# Patient Record
Sex: FEMALE | Race: WHITE | NOT HISPANIC OR LATINO | Employment: FULL TIME | URBAN - METROPOLITAN AREA
[De-identification: names, ages, dates, MRNs, and addresses within clinical notes are randomized per-mention and may not be internally consistent; named-entity substitution may affect disease eponyms.]

---

## 2017-04-08 ENCOUNTER — HOSPITAL ENCOUNTER (EMERGENCY)
Facility: CLINIC | Age: 32
Discharge: HOME | End: 2017-04-08
Attending: EMERGENCY MEDICINE | Admitting: EMERGENCY MEDICINE
Payer: COMMERCIAL

## 2017-04-08 VITALS
HEART RATE: 74 BPM | TEMPERATURE: 98.8 F | OXYGEN SATURATION: 99 % | SYSTOLIC BLOOD PRESSURE: 115 MMHG | DIASTOLIC BLOOD PRESSURE: 58 MMHG | RESPIRATION RATE: 16 BRPM

## 2017-04-08 DIAGNOSIS — G51.9 FACIAL NERVE DISORDER: ICD-10-CM

## 2017-04-08 DIAGNOSIS — G50.0 TRIGEMINAL NEURALGIA: ICD-10-CM

## 2017-04-08 LAB
ANION GAP SERPL CALCULATED.3IONS-SCNC: 6 MMOL/L (ref 3–14)
BASOPHILS # BLD AUTO: 0.1 10E9/L (ref 0–0.2)
BASOPHILS NFR BLD AUTO: 0.5 %
BUN SERPL-MCNC: 23 MG/DL (ref 7–30)
CALCIUM SERPL-MCNC: 8 MG/DL (ref 8.5–10.1)
CHLORIDE SERPL-SCNC: 115 MMOL/L (ref 94–109)
CO2 SERPL-SCNC: 23 MMOL/L (ref 20–32)
CREAT SERPL-MCNC: 0.74 MG/DL (ref 0.52–1.04)
CRP SERPL-MCNC: <2.9 MG/L (ref 0–8)
DIFFERENTIAL METHOD BLD: NORMAL
EOSINOPHIL # BLD AUTO: 0.4 10E9/L (ref 0–0.7)
EOSINOPHIL NFR BLD AUTO: 4.7 %
ERYTHROCYTE [DISTWIDTH] IN BLOOD BY AUTOMATED COUNT: 13 % (ref 10–15)
ERYTHROCYTE [SEDIMENTATION RATE] IN BLOOD BY WESTERGREN METHOD: 7 MM/H (ref 0–20)
GFR SERPL CREATININE-BSD FRML MDRD: ABNORMAL ML/MIN/1.7M2
GLUCOSE SERPL-MCNC: 93 MG/DL (ref 70–99)
HCT VFR BLD AUTO: 40.6 % (ref 35–47)
HGB BLD-MCNC: 13.6 G/DL (ref 11.7–15.7)
IMM GRANULOCYTES # BLD: 0 10E9/L (ref 0–0.4)
IMM GRANULOCYTES NFR BLD: 0.2 %
LYMPHOCYTES # BLD AUTO: 3 10E9/L (ref 0.8–5.3)
LYMPHOCYTES NFR BLD AUTO: 32.2 %
MCH RBC QN AUTO: 31.3 PG (ref 26.5–33)
MCHC RBC AUTO-ENTMCNC: 33.5 G/DL (ref 31.5–36.5)
MCV RBC AUTO: 93 FL (ref 78–100)
MONOCYTES # BLD AUTO: 1 10E9/L (ref 0–1.3)
MONOCYTES NFR BLD AUTO: 10.3 %
NEUTROPHILS # BLD AUTO: 4.8 10E9/L (ref 1.6–8.3)
NEUTROPHILS NFR BLD AUTO: 52.1 %
PLATELET # BLD AUTO: 292 10E9/L (ref 150–450)
POTASSIUM SERPL-SCNC: 4 MMOL/L (ref 3.4–5.3)
RBC # BLD AUTO: 4.35 10E12/L (ref 3.8–5.2)
SODIUM SERPL-SCNC: 144 MMOL/L (ref 133–144)
WBC # BLD AUTO: 9.2 10E9/L (ref 4–11)

## 2017-04-08 PROCEDURE — 99285 EMERGENCY DEPT VISIT HI MDM: CPT | Mod: 25 | Performed by: EMERGENCY MEDICINE

## 2017-04-08 PROCEDURE — 80048 BASIC METABOLIC PNL TOTAL CA: CPT | Performed by: EMERGENCY MEDICINE

## 2017-04-08 PROCEDURE — 96372 THER/PROPH/DIAG INJ SC/IM: CPT | Performed by: EMERGENCY MEDICINE

## 2017-04-08 PROCEDURE — 85025 COMPLETE CBC W/AUTO DIFF WBC: CPT | Performed by: EMERGENCY MEDICINE

## 2017-04-08 PROCEDURE — 86140 C-REACTIVE PROTEIN: CPT | Performed by: EMERGENCY MEDICINE

## 2017-04-08 PROCEDURE — 25000132 ZZH RX MED GY IP 250 OP 250 PS 637: Performed by: EMERGENCY MEDICINE

## 2017-04-08 PROCEDURE — 85652 RBC SED RATE AUTOMATED: CPT | Performed by: EMERGENCY MEDICINE

## 2017-04-08 PROCEDURE — 99284 EMERGENCY DEPT VISIT MOD MDM: CPT | Mod: Z6 | Performed by: EMERGENCY MEDICINE

## 2017-04-08 PROCEDURE — 25000128 H RX IP 250 OP 636: Performed by: EMERGENCY MEDICINE

## 2017-04-08 RX ORDER — GABAPENTIN 300 MG/1
300 CAPSULE ORAL ONCE
Status: COMPLETED | OUTPATIENT
Start: 2017-04-08 | End: 2017-04-08

## 2017-04-08 RX ORDER — PREDNISONE 20 MG/1
TABLET ORAL
Qty: 9 TABLET | Refills: 0 | Status: SHIPPED | OUTPATIENT
Start: 2017-04-08 | End: 2017-04-18

## 2017-04-08 RX ORDER — GABAPENTIN 300 MG/1
300 CAPSULE ORAL 3 TIMES DAILY
Qty: 90 CAPSULE | Refills: 1 | Status: SHIPPED | OUTPATIENT
Start: 2017-04-08 | End: 2017-11-10

## 2017-04-08 RX ORDER — VALACYCLOVIR HYDROCHLORIDE 1 G/1
1000 TABLET, FILM COATED ORAL 2 TIMES DAILY
Qty: 14 TABLET | Refills: 0 | Status: SHIPPED | OUTPATIENT
Start: 2017-04-08 | End: 2017-05-18

## 2017-04-08 RX ADMIN — GABAPENTIN 300 MG: 300 CAPSULE ORAL at 17:10

## 2017-04-08 RX ADMIN — HYDROMORPHONE HYDROCHLORIDE 1 MG: 1 INJECTION, SOLUTION INTRAMUSCULAR; INTRAVENOUS; SUBCUTANEOUS at 17:08

## 2017-04-08 NOTE — ED PROVIDER NOTES
History     Chief Complaint   Patient presents with     Facial Pain     Right sided facial pain. Started 4 days ago. History of this.      The history is provided by the patient.     Janeth Argueta is a 31 year old female who presents to the emergency department right sided facial pain. She states that beginning about 4 days ago she has had right sided facial pain. Patient states that in 2016 she was in the ED for similar pain and treated for Lyme's disease with a single dose of Rocephin at that time. She says she was supposed to follow-up with PCP but never did. This current episode began around her right eye but says it has spread to her entire right face and she now feels as if her right sided gums are swollen. She denies tongue involvement, change in vision, or eye tearing. Patient reports the pain is intermittent and comes in waves of sudden sharp pain. The pain is somewhat alleviated with pressure.    I have reviewed the Medications, Allergies, Past Medical and Surgical History, and Social History in the Epic system.    Patient Active Problem List   Diagnosis     Calculus of kidney     CARDIOVASCULAR SCREENING; LDL GOAL LESS THAN 160     GERD (gastroesophageal reflux disease)     Anxiety     Acute gouty arthropathy     Tobacco abuse     Major depressive disorder, recurrent episode, mild (H)     Past Medical History:   Diagnosis Date     Calculus of kidney ages 13 and 22     Tobacco abuse 2015       Past Surgical History:   Procedure Laterality Date     C OPEN RX ELBOW DISLOCATION  high school    fracture right elbow, open fixation      SECTION  2011    Procedure: SECTION; Surgeon:LACHO VALENTIN; Location: OR      TOOTH EXTRACTION W/FORCEP  age 17    wisdom tooth extraction     LITHOTRIPSY  2008    Left lithotripsy. [facility].     ORTHOPEDIC SURGERY         Family History   Problem Relation Age of Onset     DIABETES Paternal Grandmother       Hypertension Mother      CEREBROVASCULAR DISEASE Paternal Grandmother      Cardiovascular Paternal Grandmother      Musculoskeletal Disorder Mother      Congenital hip disorder     Genitourinary Problems Father      Kidney stones on dad's side     Respiratory Father      COPD - he was a smoker     Congenital Anomalies Other      cousin's baby has truncus arteriosus       Social History   Substance Use Topics     Smoking status: Former Smoker     Packs/day: 0.50     Years: 5.00     Types: Cigarettes     Quit date: 1/1/2010     Smokeless tobacco: Never Used     Alcohol use No        Immunization History   Administered Date(s) Administered     Influenza (IIV3) 10/24/2007, 12/26/2011, 10/26/2012     TDAP Vaccine (Adacel) 12/26/2011          Allergies   Allergen Reactions     No Known Allergies        Current Outpatient Prescriptions   Medication Sig Dispense Refill     IBUPROFEN PO Take 800 mg by mouth       valACYclovir (VALTREX) 1000 mg tablet Take 1 tablet (1,000 mg) by mouth 2 times daily 14 tablet 0     gabapentin (NEURONTIN) 300 MG capsule Take 1 capsule (300 mg) by mouth 3 times daily 90 capsule 1     predniSONE (DELTASONE) 20 MG tablet 2 tabs day 1-2, then 1 tab days 3-4, then 1/2 tab daily for 6 days 9 tablet 0     [DISCONTINUED] gabapentin (NEURONTIN) 300 MG capsule Take 1 capsule (300 mg) by mouth 3 times daily 90 capsule 1     [DISCONTINUED] valACYclovir (VALTREX) 1000 mg tablet Take 1 tablet (1,000 mg) by mouth 2 times daily for 7 days 14 tablet 0     IUD's (PARAGARD INTRAUTERINE COPPER) 1 each by Intrauterine route once. 1 Device 0     Review of Systems   HENT:        Right sided facial pain.   Eyes: Negative for visual disturbance.   All other systems reviewed and are negative.      Physical Exam   BP: 115/58  Pulse: 74  Temp: 98.8  F (37.1  C)  Resp: 16  SpO2: 99 %    Physical Exam   Constitutional: She is oriented to person, place, and time. She appears well-developed and well-nourished.   HENT:   Head:  Normocephalic and atraumatic.   Pain over trigeminal nerve in all branches on right side.   Eyes: Conjunctivae and EOM are normal. Pupils are equal, round, and reactive to light.   Neck: Normal range of motion. Neck supple.   Musculoskeletal: Normal range of motion.   Neurological: She is alert and oriented to person, place, and time.   No facial asymmetry.    Skin: Skin is warm and dry.   Psychiatric: She has a normal mood and affect. Her behavior is normal.   Nursing note and vitals reviewed.      ED Course     ED Course     Procedures             Results for orders placed or performed during the hospital encounter of 04/08/17 (from the past 24 hour(s))   CBC with platelets differential   Result Value Ref Range    WBC 9.2 4.0 - 11.0 10e9/L    RBC Count 4.35 3.8 - 5.2 10e12/L    Hemoglobin 13.6 11.7 - 15.7 g/dL    Hematocrit 40.6 35.0 - 47.0 %    MCV 93 78 - 100 fl    MCH 31.3 26.5 - 33.0 pg    MCHC 33.5 31.5 - 36.5 g/dL    RDW 13.0 10.0 - 15.0 %    Platelet Count 292 150 - 450 10e9/L    Diff Method Automated Method     % Neutrophils 52.1 %    % Lymphocytes 32.2 %    % Monocytes 10.3 %    % Eosinophils 4.7 %    % Basophils 0.5 %    % Immature Granulocytes 0.2 %    Absolute Neutrophil 4.8 1.6 - 8.3 10e9/L    Absolute Lymphocytes 3.0 0.8 - 5.3 10e9/L    Absolute Monocytes 1.0 0.0 - 1.3 10e9/L    Absolute Eosinophils 0.4 0.0 - 0.7 10e9/L    Absolute Basophils 0.1 0.0 - 0.2 10e9/L    Abs Immature Granulocytes 0.0 0 - 0.4 10e9/L   Basic metabolic panel   Result Value Ref Range    Sodium 144 133 - 144 mmol/L    Potassium 4.0 3.4 - 5.3 mmol/L    Chloride 115 (H) 94 - 109 mmol/L    Carbon Dioxide 23 20 - 32 mmol/L    Anion Gap 6 3 - 14 mmol/L    Glucose 93 70 - 99 mg/dL    Urea Nitrogen 23 7 - 30 mg/dL    Creatinine 0.74 0.52 - 1.04 mg/dL    GFR Estimate >90  Non  GFR Calc   >60 mL/min/1.7m2    GFR Estimate If Black >90   GFR Calc   >60 mL/min/1.7m2    Calcium 8.0 (L) 8.5 - 10.1 mg/dL    Erythrocyte sedimentation rate auto   Result Value Ref Range    Sed Rate 7 0 - 20 mm/h   CRP inflammation   Result Value Ref Range    CRP Inflammation <2.9 0.0 - 8.0 mg/L       Medications   gabapentin (NEURONTIN) capsule 300 mg (300 mg Oral Given 4/8/17 1710)   HYDROmorphone (DILAUDID) injection 1 mg (1 mg Intramuscular Given 4/8/17 1708)       Assessments & Plan (with Medical Decision Making)  Janeth is a 31 year old female who presents to the ED with complaints of right sided facial pain that began 4 days ago. She notes history of similar pain in September 2016 and was treated for Lyme's disease at that time and given gabapentin for pain. Upon arrival to the ED, the patient was vitally stable. On exam, she exhibits pain over trigeminal nerve in all branches on right side. Neuro exam is normal. The patient was given 300 mg gabapentin.  In addition, the patient was given Dilaudid 1 mg IM.  She's had significant improvement in her pain control with this combination. Labs were drawn, including CBC, BMP, erythrocyte sedimentation, and CRP.  All her labs were unremarkable.  Supervision recurrence of her trigeminal neuralgia for which I saw and treated her act on 09/20/2016.  As before, we will restart her on the Valtrex, prednisone, and gabapentin.  I did review with her indications to return to the ED for reevaluation.  Impression is for the patient were answered and she was suitable for discharge in satisfactory condition.       I have reviewed the nursing notes.    I have reviewed the findings, diagnosis, plan and need for follow up with the patient.    New Prescriptions    PREDNISONE (DELTASONE) 20 MG TABLET    2 tabs day 1-2, then 1 tab days 3-4, then 1/2 tab daily for 6 days       Final diagnoses:   Trigeminal neuralgia   This document serves as a record of services personally performed by Sulaiman Field MD. It was created on their behalf by Lianne Barrett, a trained medical scribe. The creation of  this record is based on the provider's personal observations and the statements of the patient. This document has been checked and approved by the attending provider.     Note: Chart documentation done in part with Dragon Voice Recognition software. Although reviewed after completion, some word and grammatical errors may remain.    4/8/2017   [facility] EMERGENCY DEPARTMENT     Sulaiman Field MD  04/08/17 9930

## 2017-04-08 NOTE — ED AVS SNAPSHOT
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      Follow-up Information     Follow up with Patsy Mckee MD.    Specialty:  Family Practice    Why:  As needed    Contact information:    [facility]  [address] [place] [address] [phone]          Discharge Instructions         Trigeminal Neuralgia  You have trigeminal neuralgia. This is pain caused by irritation of the trigeminal nerve on your face. Symptoms include sudden, sharp pain in your head or face. It may feel like an electric shock. It can last for several seconds or minutes. It usually happens on only 1 side of your face. Pain may be triggered by things like moving your jaw or a touch on the skin of your face. The pain may be caused by something irritating the trigeminal nerve, such as a blood vessel pressing against it. But the exact cause of this problem often isn t known. Although it can be quite painful, the condition isn t dangerous.  Trigeminal neuralgia is often treated with medications. These include anti-seizure medications or antidepressants. Certain other treatments may also help. In some cases, you may need surgery.    Home care  The health care provider may prescribe medicines to help relieve and prevent pain. Take all medicines as directed. Please note that it may take several changes in dose and medicines before the right combination is found that controls the pain.  General care:    Plan to rest at home today.    Avoid  any specific activities that seem to trigger the pain.    Over the next few weeks, keep a pain diary. Write down when your symptoms happen and how they feel. Certain activities such as touching your face, chewing, talking, or brushing your teeth may bring on the pain. Cold air can also trigger the pain. Make sure you write down any triggers and discuss these with your health care provider. This will help your provider make a plan for your treatment.  Follow-up care  Follow up with your health care provider as advised. If you were referred to a neurologist, be sure to make an appointment.  For more information on your condition, visit::    Facial Pain Association www.fpa-support.org  When to seek medical advice  Call your health care provider right away if any of these occur:    Fever of 100.4 F (38 C) or higher    Headache with very stiff neck    You aren t able to keep liquids down (repeated vomiting)    Extreme drowsiness or confusion    Dizziness or fainting    A new feeling of weakness or numbness or tingling in your arm, leg, or face    Difficulty speaking or seeing       7131-3037 [employer]. [address] [place] [address]. All rights reserved. This information is not intended as a substitute for professional medical care. Always follow your healthcare professional's instructions.          24 Hour Appointment Hotline       To make an appointment at any [facility], call [phone] ([phone]). If you don't have a family doctor or clinic, we will help you find one. [place] clinics are conveniently located to serve the needs of you and your family.             Review of your medicines      START taking        Dose / Directions Last dose taken    predniSONE 20 MG tablet   Commonly known as:  DELTASONE   Quantity:  9 tablet        2 tabs day 1-2, then 1 tab days 3-4, then 1/2 tab daily for 6 days   Refills:  0          Our records show that you are taking the medicines  listed below. If these are incorrect, please call your family doctor or clinic.        Dose / Directions Last dose taken    gabapentin 300 MG capsule   Commonly known as:  NEURONTIN   Dose:  300 mg   Quantity:  90 capsule        Take 1 capsule (300 mg) by mouth 3 times daily   Refills:  1        IBUPROFEN PO   Dose:  800 mg        Take 800 mg by mouth   Refills:  0        paragard intrauterine copper   Dose:  1 each   Quantity:  1 Device        1 each by Intrauterine route once.   Refills:  0        valACYclovir 1000 mg tablet   Commonly known as:  VALTREX   Dose:  1000 mg   Quantity:  14 tablet        Take 1 tablet (1,000 mg) by mouth 2 times daily   Refills:  0                Prescriptions were sent or printed at these locations (3 Prescriptions)                   [facility]   [address] [address]    Telephone:  [phone]   Fax:  [phone]   Hours:                  These medications are not ready yet because we are checking if your insurance will help you pay for them. Call your pharmacy to confirm that your medication is ready for pickup. It may take up to 24 hours for them to receive the prescription. If the prescription is not ready within 3 business days, please contact your clinic or your provider (3 of 3)         valACYclovir (VALTREX) 1000 mg tablet               gabapentin (NEURONTIN) 300 MG capsule               predniSONE (DELTASONE) 20 MG tablet                Procedures and tests performed during your visit     Basic metabolic panel    CBC with platelets differential    CRP inflammation    Erythrocyte sedimentation rate auto      Orders Needing Specimen Collection     None      Pending Results     No orders found from 4/6/2017 to 4/9/2017.            Pending Culture Results     No orders found from 4/6/2017 to 4/9/2017.            Thank you for choosing [facility]       Thank you for choosing [facility] for your care. Our goal is always to provide you  with excellent care. Hearing back from our patients is one way we can continue to improve our services. Please take a few minutes to complete the written survey that you may receive in the mail after you visit with us. Thank you!        [employer] Information     [employer] gives you secure access to your electronic health record. If you see a primary care provider, you can also send messages to your care team and make appointments. If you have questions, please call your primary care clinic.  If you do not have a primary care provider, please call [phone] and they will assist you.        Care EveryWhere ID     This is your Care EveryWhere ID. This could be used by other organizations to access your [place] medical records  [id number]        After Visit Summary       This is your record. Keep this with you and show to your community pharmacist(s) and doctor(s) at your next visit.

## 2017-04-08 NOTE — ED NOTES
Pt presents with severe right facial pain. Started 4 days ago. Pt has had previous episode of this. Pt was treated for lymes.

## 2017-04-08 NOTE — ED AVS SNAPSHOT
[facility] Emergency Department    [address] [place] [address]    Phone:  [phone]    Fax:  [phone]                                       Janeth Argueta   MRN: [id number]    Department:  [facility] Emergency Department   Date of Visit:  4/8/2017           After Visit Summary Signature Page     I have received my discharge instructions, and my questions have been answered. I have discussed any challenges I see with this plan with the nurse or doctor.    ..........................................................................................................................................  Patient/Patient Representative Signature      ..........................................................................................................................................  Patient Representative Print Name and Relationship to Patient    ..................................................               ................................................  Date                                            Time    ..........................................................................................................................................  Reviewed by Signature/Title    ...................................................              ..............................................  Date                                                            Time

## 2017-04-08 NOTE — DISCHARGE INSTRUCTIONS
Trigeminal Neuralgia  You have trigeminal neuralgia. This is pain caused by irritation of the trigeminal nerve on your face. Symptoms include sudden, sharp pain in your head or face. It may feel like an electric shock. It can last for several seconds or minutes. It usually happens on only 1 side of your face. Pain may be triggered by things like moving your jaw or a touch on the skin of your face. The pain may be caused by something irritating the trigeminal nerve, such as a blood vessel pressing against it. But the exact cause of this problem often isn t known. Although it can be quite painful, the condition isn t dangerous.  Trigeminal neuralgia is often treated with medications. These include anti-seizure medications or antidepressants. Certain other treatments may also help. In some cases, you may need surgery.    Home care  The health care provider may prescribe medicines to help relieve and prevent pain. Take all medicines as directed. Please note that it may take several changes in dose and medicines before the right combination is found that controls the pain.  General care:    Plan to rest at home today.    Avoid any specific activities that seem to trigger the pain.    Over the next few weeks, keep a pain diary. Write down when your symptoms happen and how they feel. Certain activities such as touching your face, chewing, talking, or brushing your teeth may bring on the pain. Cold air can also trigger the pain. Make sure you write down any triggers and discuss these with your health care provider. This will help your provider make a plan for your treatment.  Follow-up care  Follow up with your health care provider as advised. If you were referred to a neurologist, be sure to make an appointment.  For more information on your condition, visit::    Facial Pain Association www.fpa-support.org  When to seek medical advice  Call your health care provider right away if any of these occur:    Fever of 100.4 F  (38 C) or higher    Headache with very stiff neck    You aren t able to keep liquids down (repeated vomiting)    Extreme drowsiness or confusion    Dizziness or fainting    A new feeling of weakness or numbness or tingling in your arm, leg, or face    Difficulty speaking or seeing       1712-1754 [employer]. [address] [place] [address]. All rights reserved. This information is not intended as a substitute for professional medical care. Always follow your healthcare professional's instructions.

## 2017-05-18 ENCOUNTER — APPOINTMENT (OUTPATIENT)
Dept: GENERAL RADIOLOGY | Facility: CLINIC | Age: 32
End: 2017-05-18
Attending: PHYSICIAN ASSISTANT
Payer: COMMERCIAL

## 2017-05-18 ENCOUNTER — HOSPITAL ENCOUNTER (EMERGENCY)
Facility: CLINIC | Age: 32
Discharge: HOME | End: 2017-05-18
Attending: PHYSICIAN ASSISTANT | Admitting: PHYSICIAN ASSISTANT
Payer: COMMERCIAL

## 2017-05-18 VITALS
OXYGEN SATURATION: 98 % | BODY MASS INDEX: 28.1 KG/M2 | HEART RATE: 87 BPM | RESPIRATION RATE: 14 BRPM | SYSTOLIC BLOOD PRESSURE: 114 MMHG | WEIGHT: 165 LBS | DIASTOLIC BLOOD PRESSURE: 69 MMHG | TEMPERATURE: 98.6 F

## 2017-05-18 DIAGNOSIS — S93.104A TOE DISLOCATION, RIGHT, INITIAL ENCOUNTER: Primary | ICD-10-CM

## 2017-05-18 DIAGNOSIS — S92.514A CLOSED NONDISPLACED FRACTURE OF PROXIMAL PHALANX OF LESSER TOE OF RIGHT FOOT, INITIAL ENCOUNTER: ICD-10-CM

## 2017-05-18 PROCEDURE — 99284 EMERGENCY DEPT VISIT MOD MDM: CPT | Mod: 25

## 2017-05-18 PROCEDURE — 99284 EMERGENCY DEPT VISIT MOD MDM: CPT | Mod: 25 | Performed by: PHYSICIAN ASSISTANT

## 2017-05-18 PROCEDURE — 73660 X-RAY EXAM OF TOE(S): CPT | Mod: TC,RT

## 2017-05-18 PROCEDURE — 28515 TREATMENT OF TOE FRACTURE: CPT | Mod: Z6 | Performed by: PHYSICIAN ASSISTANT

## 2017-05-18 PROCEDURE — 28515 TREATMENT OF TOE FRACTURE: CPT | Performed by: PHYSICIAN ASSISTANT

## 2017-05-18 RX ORDER — LIDOCAINE HYDROCHLORIDE 10 MG/ML
5 INJECTION, SOLUTION INFILTRATION; PERINEURAL ONCE
Status: DISCONTINUED | OUTPATIENT
Start: 2017-05-18 | End: 2017-05-18 | Stop reason: HOSPADM

## 2017-05-18 ASSESSMENT — ENCOUNTER SYMPTOMS
WOUND: 0
NUMBNESS: 0

## 2017-05-18 NOTE — DISCHARGE INSTRUCTIONS
As we discussed, it appears you dislocated and broke (fractured) your baby toe today. Please keep the toe buddy-taped to the toe next to it and wear the post-op shoe for the next couple weeks. Use tylenol and ibuprofen to manage pain. Ice the toe and keep it elevated when not ambulating. Follow up with Dr. Mancia (our podiatrist) in 1-2 weeks for reevaluation. Return to emergency department for worsening symptoms.    Thank you for choosing [facility]'s Emergency Department. It was a pleasure taking care of you today. If you have any questions, please call [phone].    Maureen Tim PA-C    Toe Dislocation  A toe dislocation occurs when the tissues, or ligaments, that hold the joint together are torn. The bones then move apart, or are dislocated, out of their normal position. This causes pain, swelling, and bruising. Sometimes there is also a small chip fracture. Once the joint is put back into place again, it will take about 6 weeks for the ligaments to heal. During this time, your toe should be protected from re-injury. Sometimes this is done by taping the injured toe to the one next to it. This is called buddy taping.    If your toenail has been severely injured, it may fall off in 1 to 2 weeks. A new one will usually start to grow back within 1 month.  Home care    Keep your leg raised, or elevated, to reduce pain and swelling. When sleeping, place a pillow under the injured leg. When sitting, support your injured leg so it is level with your waist. This is very important during the first 48 hours.    Apply an ice pack over the injured area for no more than 15 to 20 minutes. Do this every 3 to 6 hours for the first 24 to 48 hours. After that, keep using ice packs as needed to ease pain and swelling. To make an ice pack, put ice cubes in a sealed zip-lock plastic bag. Then wrap the bag in a thin, clean towel or cloth. As the ice melts, be careful that any tape, gauze, or splint doesn t get wet.    If  you have a removable splint, you may take it off to bathe, then put it back on. If you have a permanent splint, cover your entire foot with 2 plastic bags. Place 1 bag around the other. Tape each bag with duct tape at the top end. Water can still leak in even when your foot is covered. So it's best to keep the splint away from water. If a splint gets wet, you can dry it with a hair-dryer on a cool setting.     If anthony tape was applied and it becomes wet or dirty, change it. You may replace it with paper, plastic, or cloth tape. Cloth tape and paper tapes must be kept dry. When re-applying anthony tape, use gauze or cotton padding between your toes. This will prevent the skin from getting moist and breaking down, or macerating. It is very important to put padding at the web space. This is the small piece of skin that joins the bases of your toes. Keep the anthony tape in place as instructed by your healthcare provider.    You may use over-the-counter pain medicine to control pain, unless another pain medicine was prescribed. Talk with your provider before using these medicines if you have chronic liver or kidney disease, or ever had a stomach ulcer or GI (gastrointestinal) bleeding.    If you were given crutches, don t bear weight on your injured foot until you can do so without pain.    If you were given a stiff sandal, called a cast shoe, or a special boot, use this until you can walk barefoot without pain.    You may return to sports or physical activities as advised by your provider. How long this takes will depend on your injury. You may be able to go back to your activities right away. Or you may need to wait up to 8 weeks.  Follow-up care  Follow up with your healthcare provider, or as advised.   If X-rays were taken, you will be told of any new findings that may affect your care.  When to seek medical advice  Call your healthcare provider if any of the following occur:    The pain or swelling increases    Your  toes becomes cold, blue, numb, or tingly    Your injured toe has redness, warmth, or fluid drainage

## 2017-05-18 NOTE — ED AVS SNAPSHOT
[facility] Emergency Department    [address] [place] [address]    Phone:  [phone]    Fax:  [phone]                                       Janeth Argueta   MRN: [id number]    Department:  [facility] Emergency Department   Date of Visit:  5/18/2017           After Visit Summary Signature Page     I have received my discharge instructions, and my questions have been answered. I have discussed any challenges I see with this plan with the nurse or doctor.    ..........................................................................................................................................  Patient/Patient Representative Signature      ..........................................................................................................................................  Patient Representative Print Name and Relationship to Patient    ..................................................               ................................................  Date                                            Time    ..........................................................................................................................................  Reviewed by Signature/Title    ...................................................              ..............................................  Date                                                            Time

## 2017-05-18 NOTE — LETTER
[facility] EMERGENCY DEPARTMENT  [address] [place] [address]  [phone]  Dept: [phone]      5/18/2017    Re: Janeth STRANGE Taylerronald      TO WHOM IT MAY CONCERN:    Janeth Argueta was seen on 5/18/17.  Please excuse her from being on her feet at work for the next week due to injury. If this cannot be accomplished, please excuse her from work for the next week.    Cordially,          Maureen Tim PA-C   [facility] EMERGENCY DEPARTMENT

## 2017-05-18 NOTE — ED AVS SNAPSHOT
[facility] Emergency Department    [address] [place] [address]    Phone:  [phone]    Fax:  [phone]                                       Janeth Argueta   MRN: [id number]    Department:  [facility] Emergency Department   Date of Visit:  5/18/2017           Patient Information     Date Of Birth          1985        Your diagnoses for this visit were:     Closed nondisplaced fracture of proximal phalanx of lesser toe of right foot, initial encounter     Toe dislocation, right, initial encounter        You were seen by Maureen Tim PA-C.      Follow-up Information     Follow up with Kenny Mancia DPM In 2 weeks.    Specialty:  Podiatry    Why:  ER follow up    Contact information:    [address] [place] [address] [phone]          Follow up with [facility] Emergency Department.    Specialty:  EMERGENCY MEDICINE    Why:  If symptoms worsen    Contact information:    [address] [place] [address] [address] [place] [address] [phone]    Additional information:    From [address]: Exit at [address] on south side of [place]. Turn right on [address]. Turn left at stoplight on [address]. [facility] will be in view two blocks ahead        Discharge Instructions       As we discussed, it appears you dislocated and broke (fractured) your baby toe today. Please keep the toe anthony-taped to the toe next to it and wear the post-op shoe for the next couple weeks. Use tylenol and ibuprofen to manage pain. Ice the toe and keep it elevated when not ambulating. Follow up with Dr. Mancia (our podiatrist) in 1-2 weeks for reevaluation. Return to emergency department for worsening symptoms.    Thank you for choosing [facility]'s Emergency Department. It was a pleasure taking care of you today. If you have any questions, please call [phone].    Maureen Tim PA-C    Toe Dislocation  A toe dislocation  occurs when the tissues, or ligaments, that hold the joint together are torn. The bones then move apart, or are dislocated, out of their normal position. This causes pain, swelling, and bruising. Sometimes there is also a small chip fracture. Once the joint is put back into place again, it will take about 6 weeks for the ligaments to heal. During this time, your toe should be protected from re-injury. Sometimes this is done by taping the injured toe to the one next to it. This is called buddy taping.    If your toenail has been severely injured, it may fall off in 1 to 2 weeks. A new one will usually start to grow back within 1 month.  Home care    Keep your leg raised, or elevated, to reduce pain and swelling. When sleeping, place a pillow under the injured leg. When sitting, support your injured leg so it is level with your waist. This is very important during the first 48 hours.    Apply an ice pack over the injured area for no more than 15 to 20 minutes. Do this every 3 to 6 hours for the first 24 to 48 hours. After that, keep using ice packs as needed to ease pain and swelling. To make an ice pack, put ice cubes in a sealed zip-lock plastic bag. Then wrap the bag in a thin, clean towel or cloth. As the ice melts, be careful that any tape, gauze, or splint doesn t get wet.    If you have a removable splint, you may take it off to bathe, then put it back on. If you have a permanent splint, cover your entire foot with 2 plastic bags. Place 1 bag around the other. Tape each bag with duct tape at the top end. Water can still leak in even when your foot is covered. So it's best to keep the splint away from water. If a splint gets wet, you can dry it with a hair-dryer on a cool setting.     If buddy tape was applied and it becomes wet or dirty, change it. You may replace it with paper, plastic, or cloth tape. Cloth tape and paper tapes must be kept dry. When re-applying buddy tape, use gauze or cotton padding between  your toes. This will prevent the skin from getting moist and breaking down, or macerating. It is very important to put padding at the web space. This is the small piece of skin that joins the bases of your toes. Keep the anthony tape in place as instructed by your healthcare provider.    You may use over-the-counter pain medicine to control pain, unless another pain medicine was prescribed. Talk with your provider before using these medicines if you have chronic liver or kidney disease, or ever had a stomach ulcer or GI (gastrointestinal) bleeding.    If you were given crutches, don t bear weight on your injured foot until you can do so without pain.    If you were given a stiff sandal, called a cast shoe, or a special boot, use this until you can walk barefoot without pain.    You may return to sports or physical activities as advised by your provider. How long this takes will depend on your injury. You may be able to go back to your activities right away. Or you may need to wait up to 8 weeks.  Follow-up care  Follow up with your healthcare provider, or as advised.   If X-rays were taken, you will be told of any new findings that may affect your care.  When to seek medical advice  Call your healthcare provider if any of the following occur:    The pain or swelling increases    Your toes becomes cold, blue, numb, or tingly    Your injured toe has redness, warmth, or fluid drainage                24 Hour Appointment Hotline       To make an appointment at any [facility], call [phone] ([phone]). If you don't have a family doctor or clinic, we will help you find one. [facility] clinics are conveniently located to serve the needs of you and your family.             Review of your medicines      Our records show that you are taking the medicines listed below. If these are incorrect, please call your family doctor or clinic.        Dose / Directions Last dose taken    gabapentin 300 MG capsule   Commonly  known as:  NEURONTIN   Dose:  300 mg   Quantity:  90 capsule        Take 1 capsule (300 mg) by mouth 3 times daily   Refills:  1        IBUPROFEN PO   Dose:  800 mg        Take 800 mg by mouth   Refills:  0        paragard intrauterine copper   Dose:  1 each   Quantity:  1 Device        1 each by Intrauterine route once.   Refills:  0                Procedures and tests performed during your visit     Orthopedic injury treatment    X-ray rt toe(s) G/E 2 views      Orders Needing Specimen Collection     None      Pending Results     Date and Time Order Name Status Description    5/18/2017 1322 X-ray rt toe(s) G/E 2 views Preliminary             Pending Culture Results     No orders found from 5/16/2017 to 5/19/2017.            Pending Results Instructions     If you had any lab results that were not finalized at the time of your Discharge, you can call the ED Lab Result RN at [phone]. You will be contacted by this team for any positive Lab results or changes in treatment. The nurses are available 7 days a week from 10A to 6:30P.  You can leave a message 24 hours per day and they will return your call.        Thank you for choosing [facility]       Thank you for choosing [facility] for your care. Our goal is always to provide you with excellent care. Hearing back from our patients is one way we can continue to improve our services. Please take a few minutes to complete the written survey that you may receive in the mail after you visit with us. Thank you!        [employer] Information     [employer] gives you secure access to your electronic health record. If you see a primary care provider, you can also send messages to your care team and make appointments. If you have questions, please call your primary care clinic.  If you do not have a primary care provider, please call [phone] and they will assist you.        Care EveryWhere ID     This is your Care EveryWhere ID. This could be used by other organizations to  access your [facility] medical records  [id number]        After Visit Summary       This is your record. Keep this with you and show to your community pharmacist(s) and doctor(s) at your next visit.

## 2017-05-24 ENCOUNTER — OFFICE VISIT (OUTPATIENT)
Dept: PODIATRY | Facility: CLINIC | Age: 32
End: 2017-05-24
Payer: COMMERCIAL

## 2017-05-24 VITALS — BODY MASS INDEX: 28.17 KG/M2 | HEIGHT: 64 IN | WEIGHT: 165 LBS | TEMPERATURE: 97.5 F

## 2017-05-24 DIAGNOSIS — S92.514A CLOSED NONDISPLACED FRACTURE OF PROXIMAL PHALANX OF LESSER TOE OF RIGHT FOOT, INITIAL ENCOUNTER: Primary | ICD-10-CM

## 2017-05-24 PROCEDURE — 99203 OFFICE O/P NEW LOW 30 MIN: CPT | Performed by: PODIATRIST

## 2017-05-24 ASSESSMENT — PAIN SCALES - GENERAL: PAINLEVEL: MILD PAIN (2)

## 2017-05-24 NOTE — MR AVS SNAPSHOT
"              After Visit Summary   5/24/2017    Janeth Argueta    MRN: [id number]           Patient Information     Date Of Birth          1985        Visit Information        Provider Department      5/24/2017 2:45 PM Kenny Mancia DPM [facility]        Today's Diagnoses     Closed nondisplaced fracture of proximal phalanx of lesser toe of right foot, initial encounter    -  1      Care Instructions    Compression dressing postoperative shoe. Activities as tolerated          Follow-ups after your visit        Who to contact     If you have questions or need follow up information about today's clinic visit or your schedule please contact [facility] directly at [phone].  Normal or non-critical lab and imaging results will be communicated to you by [employer], letter or phone within 4 business days after the clinic has received the results. If you do not hear from us within 7 days, please contact the clinic through [facility] or phone. If you have a critical or abnormal lab result, we will notify you by phone as soon as possible.  Submit refill requests through [email or web address] or call your pharmacy and they will forward the refill request to us. Please allow 3 business days for your refill to be completed.          Additional Information About Your Visit        MyChart Information     [email or web address] gives you secure access to your electronic health record. If you see a primary care provider, you can also send messages to your care team and make appointments. If you have questions, please call your primary care clinic.  If you do not have a primary care provider, please call [phone] and they will assist you.        Care EveryWhere ID     This is your Care EveryWhere ID. This could be used by other organizations to access your [facility] medical records  [id number]        Your Vitals Were     Temperature Height BMI (Body Mass Index)             97.5  F (36.4  C) (Temporal) 5' 4.25\" " (1.632 m) 28.1 kg/m2          Blood Pressure from Last 3 Encounters:   05/18/17 114/69   04/08/17 115/58   09/20/16 99/73    Weight from Last 3 Encounters:   05/24/17 165 lb (74.8 kg)   05/18/17 165 lb (74.8 kg)   09/20/16 168 lb (76.2 kg)              Today, you had the following     No orders found for display       Primary Care Provider Office Phone # Fax #    Patsy Neli Mckee [phone]       [facility] [address] [place] [address]        Thank you!     Thank you for choosing [facility]  for your care. Our goal is always to provide you with excellent care. Hearing back from our patients is one way we can continue to improve our services. Please take a few minutes to complete the written survey that you may receive in the mail after your visit with us. Thank you!             Your Updated Medication List - Protect others around you: Learn how to safely use, store and throw away your medicines at www.disposemymeds.org.          This list is accurate as of: 5/24/17  3:37 PM.  Always use your most recent med list.                   Brand Name Dispense Instructions for use    gabapentin 300 MG capsule    NEURONTIN    90 capsule    Take 1 capsule (300 mg) by mouth 3 times daily       IBUPROFEN PO      Take 800 mg by mouth       paragard intrauterine copper     1 Device    1 each by Intrauterine route once.

## 2017-05-24 NOTE — NURSING NOTE
"Chief Complaint   Patient presents with     Consult     WB w/post op shoe, pain 2 - Right 5th toe fracture, DOI 5/18/2017; new pt       Initial Temp 97.5  F (36.4  C) (Temporal)  Ht 5' 4.25\" (1.632 m)  Wt 165 lb (74.8 kg)  BMI 28.1 kg/m2 Estimated body mass index is 28.1 kg/(m^2) as calculated from the following:    Height as of this encounter: 5' 4.25\" (1.632 m).    Weight as of this encounter: 165 lb (74.8 kg).  BP completed using cuff size: NA (Not Taken)  Medication Reconciliation: complete    Nafisa Barrios CMA, May 24, 2017    "

## 2017-05-24 NOTE — PROGRESS NOTES
HPI:  Janeth Argueta is a 32 year old female who is seen in consultation at the request of Maureen Tim PA-C.    Pt presents for eval of:   (Onset, Location, L/R, Character, Treatments, Injury if yes)     XR Right toes 2017, stubbed Right 5th toe on recliner.  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    Weight management plan: Patient was referred to their PCP to discuss a diet and exercise plan.     ROS:  10 point ROS neg other than the symptoms noted above in the HPI.    PAST MEDICAL HISTORY:   Past Medical History:   Diagnosis Date     Calculus of kidney ages 13 and 22     Tobacco abuse 2015        PAST SURGICAL HISTORY:   Past Surgical History:   Procedure Laterality Date     C OPEN RX ELBOW DISLOCATION  high school    fracture right elbow, open fixation      SECTION  2011    Procedure: SECTION; Surgeon:LACHO VALENTIN; Location:[facility] OR      TOOTH EXTRACTION W/FORCEP  age 17    wisdom tooth extraction     LITHOTRIPSY  2008    Left lithotripsy. [facility]     ORTHOPEDIC SURGERY          MEDICATIONS:   Current Outpatient Prescriptions:      IBUPROFEN PO, Take 800 mg by mouth, Disp: , Rfl:      IUD's (PARAGARD INTRAUTERINE COPPER), 1 each by Intrauterine route once., Disp: 1 Device, Rfl: 0     gabapentin (NEURONTIN) 300 MG capsule, Take 1 capsule (300 mg) by mouth 3 times daily, Disp: 90 capsule, Rfl: 1     ALLERGIES:    Allergies   Allergen Reactions     No Known Allergies         SOCIAL HISTORY:   Social History     Social History     Marital status:      Spouse name: Umair     Number of children: 2     Years of education: N/A     Occupational History      [employer]     Social History Main Topics     Smoking status: Former Smoker      "Packs/day: 0.50     Years: 5.00     Types: Cigarettes     Quit date: 1/1/2010     Smokeless tobacco: Never Used     Alcohol use No     Drug use: No      Comment: THC in the past     Sexual activity: Yes     Partners: Male     Birth control/ protection: IUD     Other Topics Concern      Service No     Blood Transfusions No     Caffeine Concern No     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 No smokers in the home.  Has one indoor cat.  Advised about toxoplasmosis precautions.  No concerns about domestic violence.        FAMILY HISTORY:   Family History   Problem Relation Age of Onset     Hypertension Mother      Musculoskeletal Disorder Mother      Congenital hip disorder     Genitourinary Problems Father      Kidney stones on dad's side     Respiratory Father      COPD - he was a smoker     DIABETES Paternal Grandmother      CEREBROVASCULAR DISEASE Paternal Grandmother      Cardiovascular Paternal Grandmother      Congenital Anomalies Other      cousin's baby has truncus arteriosus        EXAM:Vitals: Temp 97.5  F (36.4  C) (Temporal)  Ht 5' 4.25\" (1.632 m)  Wt 165 lb (74.8 kg)  BMI 28.1 kg/m2  BMI= Body mass index is 28.1 kg/(m^2).    General appearance: Patient is alert and fully cooperative with history & exam.  No sign of distress is noted during the visit.     Psychiatric: Affect is pleasant & appropriate.  Patient appears motivated to improve health.     Respiratory: Breathing is regular & unlabored while sitting.     HEENT: Hearing is intact to spoken word.  Speech is clear.  No gross evidence of visual impairment that would impact ambulation.     Vascular: DP & PT pulses are intact & regular bilaterally.  No " significant edema or varicosities noted.  CFT and skin temperature is normal to both lower extremities.     Neurologic: Lower extremity sensation is intact to light touch.  No evidence of weakness or contracture in the lower extremities.  No evidence of neuropathy.    Dermatologic: Skin is intact to both lower extremities with adequate texture, turgor and tone about the integument.  No paronychia or evidence of soft tissue infection is noted.     Musculoskeletal: Patient is ambulatory with postop shoe. Ecchymosis extending from the second third fourth fifth digits throughout the forefoot and mid metatarsals. Pain and guarding with any range of motion or palpation of the fourth fifth metatarsal shaft and digits. Fifth digit is most edematous to most painful.    Radiographs: Transverse fracture noted about the head of the proximal phalanx right fifth.  Acceptable alignment.     ASSESSMENT:       ICD-10-CM    1. Closed nondisplaced fracture of proximal phalanx of lesser toe of right foot, initial encounter S92.514A         PLAN:  Reviewed patient's chart in [facility].      5/24/2017   Interpreted radiographs  Recommended and demonstrated anthony splinting compression of the fifth digit. Recommended staying in the postoperative shoe for approximately 2-4 weeks or until all edema and pain is resolved then transition to regular shoes. The stiffest shoes possible will provide the best protection and reduction symptoms.     She can return to light duty work at any point in time. I would expect this patient to return to regular shoe gear typically in 4 weeks but possibly 3-6 weeks in regular shoe gear without restrictions. Follow up with me again in about 2 weeks to confirm this progresses well. She may call for reduced restrictions in the next few days and we discussed this.    We reviewed that it takes 6 weeks for bone healing to occur         Kenny Mancia DPM

## 2017-05-26 ENCOUNTER — TELEPHONE (OUTPATIENT)
Dept: PODIATRY | Facility: CLINIC | Age: 32
End: 2017-05-26

## 2017-05-26 NOTE — TELEPHONE ENCOUNTER
Our goal is to have forms completed with 72 hours, however some forms may require a visit or additional information.    Who is the form from?: Patient  Where the form came from: Patient or family brought in     What clinic location was the form placed at?: [place]  Where the form was placed: '[facility]  What number is listed as a contact on the form?: [place]    Phone call message- patient request for a letter, form or note:    Date needed: as soon as possible  Patient will  at the clinic when completed  Please call the patient when completed  Has the patient signed a consent form for release of information? YES    Additional comments:     Call taken on 5/26/2017 at 2:33 PM by Jennifer Alexander    Type of letter, form or note: [facility]

## 2017-06-05 ENCOUNTER — OFFICE VISIT (OUTPATIENT)
Dept: PODIATRY | Facility: CLINIC | Age: 32
End: 2017-06-05
Payer: COMMERCIAL

## 2017-06-05 VITALS — BODY MASS INDEX: 28.17 KG/M2 | WEIGHT: 165 LBS | HEIGHT: 64 IN | TEMPERATURE: 97.6 F

## 2017-06-05 DIAGNOSIS — S92.514A CLOSED NONDISPLACED FRACTURE OF PROXIMAL PHALANX OF LESSER TOE OF RIGHT FOOT, INITIAL ENCOUNTER: Primary | ICD-10-CM

## 2017-06-05 PROCEDURE — 99213 OFFICE O/P EST LOW 20 MIN: CPT | Performed by: PODIATRIST

## 2017-06-05 ASSESSMENT — PAIN SCALES - GENERAL: PAINLEVEL: NO PAIN (0)

## 2017-06-05 NOTE — MR AVS SNAPSHOT
"              After Visit Summary   6/5/2017    Janeth Argueta    MRN: [id number]           Patient Information     Date Of Birth          1985        Visit Information        Provider Department      6/5/2017 10:45 AM Kenny Mancia DPM [facility]        Today's Diagnoses     Closed nondisplaced fracture of proximal phalanx of lesser toe of right foot, initial encounter    -  1      Care Instructions    Follow-up as needed.          Follow-ups after your visit        Who to contact     If you have questions or need follow up information about today's clinic visit or your schedule please contact [facility] directly at [phone].  Normal or non-critical lab and imaging results will be communicated to you by [employer], letter or phone within 4 business days after the clinic has received the results. If you do not hear from us within 7 days, please contact the clinic through [employer] or phone. If you have a critical or abnormal lab result, we will notify you by phone as soon as possible.  Submit refill requests through [employer] or call your pharmacy and they will forward the refill request to us. Please allow 3 business days for your refill to be completed.          Additional Information About Your Visit        MyChart Information     Horse[employer]ot gives you secure access to your electronic health record. If you see a primary care provider, you can also send messages to your care team and make appointments. If you have questions, please call your primary care clinic.  If you do not have a primary care provider, please call [phone] and they will assist you.        Care EveryWhere ID     This is your Care EveryWhere ID. This could be used by other organizations to access your [facility] medical records  [id number]        Your Vitals Were     Temperature Height BMI (Body Mass Index)             97.6  F (36.4  C) (Temporal) 5' 4.25\" (1.632 m) 28.1 kg/m2          Blood Pressure " from Last 3 Encounters:   05/18/17 114/69   04/08/17 115/58   09/20/16 99/73    Weight from Last 3 Encounters:   06/05/17 165 lb (74.8 kg)   05/24/17 165 lb (74.8 kg)   05/18/17 165 lb (74.8 kg)              Today, you had the following     No orders found for display       Primary Care Provider Office Phone # Fax #    Patsy Neli Mckee [phone]       [facility] [address] [place] [address]        Thank you!     Thank you for choosing [facility]  for your care. Our goal is always to provide you with excellent care. Hearing back from our patients is one way we can continue to improve our services. Please take a few minutes to complete the written survey that you may receive in the mail after your visit with us. Thank you!             Your Updated Medication List - Protect others around you: Learn how to safely use, store and throw away your medicines at www.disposemymeds.org.          This list is accurate as of: 6/5/17 11:29 AM.  Always use your most recent med list.                   Brand Name Dispense Instructions for use    gabapentin 300 MG capsule    NEURONTIN    90 capsule    Take 1 capsule (300 mg) by mouth 3 times daily       IBUPROFEN PO      Take 800 mg by mouth       paragard intrauterine copper     1 Device    1 each by Intrauterine route once.

## 2017-06-05 NOTE — NURSING NOTE
"Chief Complaint   Patient presents with     RECHECK     WB w/post op shoe, no pain today - Right 5th toe fracture, DOI 5/18/2017; LOV 5/24/2017       Initial Temp 97.6  F (36.4  C) (Temporal)  Ht 5' 4.25\" (1.632 m)  Wt 165 lb (74.8 kg)  BMI 28.1 kg/m2 Estimated body mass index is 28.1 kg/(m^2) as calculated from the following:    Height as of this encounter: 5' 4.25\" (1.632 m).    Weight as of this encounter: 165 lb (74.8 kg).  BP completed using cuff size: NA (Not Taken)  Medication Reconciliation: complete    Nafisa Barrios CMA, June 5, 2017    "

## 2017-06-05 NOTE — PROGRESS NOTES
"Chief Complaint   Patient presents with     RECHECK     WB w/post op shoe, no pain today - Right 5th toe fracture, DOI 5/18/2017; LOV 5/24/2017       Weight management plan: Patient was referred to their PCP to discuss a diet and exercise plan.     HPI:  Janeth Argueta is a 32 year old female who is seen in consultation at the request of Maureen Tim PA-C.    Pt presents for eval of:   (Onset, Location, L/R, Character, Treatments, Injury if yes)     XR Right toes 5/18/2017 5/18/2017, stubbed Right 5th toe on recliner.  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     EXAM:Vitals: Temp 97.6  F (36.4  C) (Temporal)  Ht 5' 4.25\" (1.632 m)  Wt 165 lb (74.8 kg)  BMI 28.1 kg/m2  BMI= Body mass index is 28.1 kg/(m^2).    General appearance: Patient is alert and fully cooperative with history & exam.  No sign of distress is noted during the visit.     Psychiatric: Affect is pleasant & appropriate.  Patient appears motivated to improve health.     Respiratory: Breathing is regular & unlabored while sitting.     HEENT: Hearing is intact to spoken word.  Speech is clear.  No gross evidence of visual impairment that would impact ambulation.     Vascular: DP & PT pulses are intact & regular bilaterally.  No significant edema or varicosities noted.  CFT and skin temperature is normal to both lower extremities.     Neurologic: Lower extremity sensation is intact to light touch.  No evidence of weakness or contracture in the lower extremities.  No evidence of neuropathy.    Dermatologic: Skin is intact to both lower extremities with adequate texture, turgor and tone about the integument.  No paronychia or evidence of soft tissue infection is noted.     Musculoskeletal: Patient is ambulatory with postop shoe. No edema is palpable " however she does have minimal discomfort with very firm palpation proximal phalanx of the right fifth digit. No deformity noted. Full range of motion of the fifth MPJ.    Radiographs: Transverse fracture noted about the head of the proximal phalanx right fifth.  Acceptable alignment.     ASSESSMENT:       ICD-10-CM    1. Closed nondisplaced fracture of proximal phalanx of lesser toe of right foot, initial encounter S92.514A         PLAN:  Reviewed patient's chart in [facility].      5/24/2017   Interpreted radiographs  Recommended and demonstrated anthony splinting compression of the fifth digit. Recommended staying in the postoperative shoe for approximately 2-4 weeks or until all edema and pain is resolved then transition to regular shoes. The stiffest shoes possible will provide the best protection and reduction symptoms.     She can return to light duty work at any point in time. I would expect this patient to return to regular shoe gear typically in 4 weeks but possibly 3-6 weeks in regular shoe gear without restrictions. Follow up with me again in about 2 weeks to confirm this progresses well. She may call for reduced restrictions in the next few days and we discussed this.    We reviewed that it takes 6 weeks for bone healing to occur     6/5/2017  A letter was dispensed to return to work today in her steel toe boots  Continue anthony splinting as tolerated  If she fails returning to work in regular shoe gear she can call and we will excuse her from work for a bit longer.  Follow-up if this does not return to her present activity without symptoms.    Kenny Mancia DPM

## 2017-06-05 NOTE — LETTER
[address] [address]  [phone]    2017      RE:  Janeth Argueta  : 1985      To whom it may concern:    This patient may return to work 17 without restrictions.  Was seen in office today.     Sincerely,          Kenny Mancia DPM

## 2017-08-01 ENCOUNTER — HOSPITAL ENCOUNTER (EMERGENCY)
Facility: CLINIC | Age: 32
Discharge: HOME | End: 2017-08-01
Attending: FAMILY MEDICINE | Admitting: FAMILY MEDICINE
Payer: COMMERCIAL

## 2017-08-01 VITALS
TEMPERATURE: 98.1 F | RESPIRATION RATE: 16 BRPM | SYSTOLIC BLOOD PRESSURE: 103 MMHG | WEIGHT: 189 LBS | OXYGEN SATURATION: 97 % | BODY MASS INDEX: 32.19 KG/M2 | HEART RATE: 72 BPM | DIASTOLIC BLOOD PRESSURE: 63 MMHG

## 2017-08-01 DIAGNOSIS — G43.C1 INTRACTABLE PERIODIC HEADACHE SYNDROME: ICD-10-CM

## 2017-08-01 LAB
BASOPHILS # BLD AUTO: 0.1 10E9/L (ref 0–0.2)
BASOPHILS NFR BLD AUTO: 0.7 %
CRP SERPL-MCNC: <2.9 MG/L (ref 0–8)
DIFFERENTIAL METHOD BLD: ABNORMAL
EOSINOPHIL # BLD AUTO: 0.4 10E9/L (ref 0–0.7)
EOSINOPHIL NFR BLD AUTO: 3.5 %
ERYTHROCYTE [DISTWIDTH] IN BLOOD BY AUTOMATED COUNT: 12.7 % (ref 10–15)
HCT VFR BLD AUTO: 40 % (ref 35–47)
HGB BLD-MCNC: 12.7 G/DL (ref 11.7–15.7)
IMM GRANULOCYTES # BLD: 0 10E9/L (ref 0–0.4)
IMM GRANULOCYTES NFR BLD: 0.4 %
LYMPHOCYTES # BLD AUTO: 3.1 10E9/L (ref 0.8–5.3)
LYMPHOCYTES NFR BLD AUTO: 29.8 %
MCH RBC QN AUTO: 32.7 PG (ref 26.5–33)
MCHC RBC AUTO-ENTMCNC: 31.8 G/DL (ref 31.5–36.5)
MCV RBC AUTO: 103 FL (ref 78–100)
MONOCYTES # BLD AUTO: 0.8 10E9/L (ref 0–1.3)
MONOCYTES NFR BLD AUTO: 8 %
NEUTROPHILS # BLD AUTO: 6 10E9/L (ref 1.6–8.3)
NEUTROPHILS NFR BLD AUTO: 57.6 %
PLATELET # BLD AUTO: 196 10E9/L (ref 150–450)
RBC # BLD AUTO: 3.88 10E12/L (ref 3.8–5.2)
WBC # BLD AUTO: 11 10E9/L (ref 4–11)

## 2017-08-01 PROCEDURE — 96375 TX/PRO/DX INJ NEW DRUG ADDON: CPT | Performed by: FAMILY MEDICINE

## 2017-08-01 PROCEDURE — 96361 HYDRATE IV INFUSION ADD-ON: CPT | Performed by: FAMILY MEDICINE

## 2017-08-01 PROCEDURE — 99284 EMERGENCY DEPT VISIT MOD MDM: CPT | Mod: 25 | Performed by: FAMILY MEDICINE

## 2017-08-01 PROCEDURE — 99284 EMERGENCY DEPT VISIT MOD MDM: CPT | Mod: Z6 | Performed by: FAMILY MEDICINE

## 2017-08-01 PROCEDURE — 86140 C-REACTIVE PROTEIN: CPT | Performed by: FAMILY MEDICINE

## 2017-08-01 PROCEDURE — 25000128 H RX IP 250 OP 636: Performed by: FAMILY MEDICINE

## 2017-08-01 PROCEDURE — 85025 COMPLETE CBC W/AUTO DIFF WBC: CPT | Performed by: FAMILY MEDICINE

## 2017-08-01 PROCEDURE — 96374 THER/PROPH/DIAG INJ IV PUSH: CPT | Performed by: FAMILY MEDICINE

## 2017-08-01 RX ORDER — KETOROLAC TROMETHAMINE 30 MG/ML
30 INJECTION, SOLUTION INTRAMUSCULAR; INTRAVENOUS ONCE
Status: COMPLETED | OUTPATIENT
Start: 2017-08-01 | End: 2017-08-01

## 2017-08-01 RX ORDER — SODIUM CHLORIDE 9 MG/ML
1000 INJECTION, SOLUTION INTRAVENOUS CONTINUOUS
Status: DISCONTINUED | OUTPATIENT
Start: 2017-08-01 | End: 2017-08-01 | Stop reason: HOSPADM

## 2017-08-01 RX ORDER — SUMATRIPTAN 25 MG/1
TABLET, FILM COATED ORAL
Qty: 8 TABLET | Refills: 0 | Status: SHIPPED | OUTPATIENT
Start: 2017-08-01 | End: 2017-11-10

## 2017-08-01 RX ORDER — DIPHENHYDRAMINE HYDROCHLORIDE 50 MG/ML
25 INJECTION INTRAMUSCULAR; INTRAVENOUS ONCE
Status: COMPLETED | OUTPATIENT
Start: 2017-08-01 | End: 2017-08-01

## 2017-08-01 RX ADMIN — PROCHLORPERAZINE EDISYLATE 7.5 MG: 5 INJECTION INTRAMUSCULAR; INTRAVENOUS at 01:15

## 2017-08-01 RX ADMIN — DIPHENHYDRAMINE HYDROCHLORIDE 25 MG: 50 INJECTION, SOLUTION INTRAMUSCULAR; INTRAVENOUS at 01:14

## 2017-08-01 RX ADMIN — KETOROLAC TROMETHAMINE 30 MG: 30 INJECTION, SOLUTION INTRAMUSCULAR at 01:12

## 2017-08-01 RX ADMIN — SODIUM CHLORIDE 1000 ML: 9 INJECTION, SOLUTION INTRAVENOUS at 01:11

## 2017-08-01 ASSESSMENT — ENCOUNTER SYMPTOMS
FATIGUE: 1
RESPIRATORY NEGATIVE: 1
MUSCULOSKELETAL NEGATIVE: 1
WEAKNESS: 0
HEADACHES: 1
NUMBNESS: 0
CARDIOVASCULAR NEGATIVE: 1
PSYCHIATRIC NEGATIVE: 1
PHOTOPHOBIA: 1
GASTROINTESTINAL NEGATIVE: 1

## 2017-08-01 NOTE — ED AVS SNAPSHOT
[facility] Emergency Department    [address] [place] [address]    Phone:  [phone]    Fax:  [phone]                                       Janeth Argueta   MRN: [id number]    Department:  [facility] Emergency Department   Date of Visit:  8/1/2017           Patient Information     Date Of Birth          1985        Your diagnoses for this visit were:     Intractable periodic headache syndrome        You were seen by Ramiro Rodriguez DO.      Follow-up Information     Schedule an appointment as soon as possible for a visit with Patsy Mckee MD.    Specialty:  Family Practice    Why:  for recheck    Contact information:    [facility]  [address] [place] [address] [phone]          Follow up with [facility] Emergency Department.    Specialty:  EMERGENCY MEDICINE    Why:  If symptoms worsen    Contact information:    [address]   [place] [address] [phone]    Additional information:    From [address]: Exit at [address] on [address] side of [address]. Turn right on [address]. Turn left at stoplight on [address]. [facility] will be in view two blocks ahead        Discharge Instructions       Please read and follow the handout(s) instructions. Return, if needed, for increased or worsening symptoms and as directed by the handout(s).    See the prescription medication for use should the headache return. Use the medication as soon as possible after the headache starts as the medication works best in the early stages of the headache.    Please make an appointment with Dr. Mckee for recheck.    I'm glad you are feeling improved.     Electronically signed, Ramiro Rodriguez DO      Discharge References/Attachments     MIGRAINE HEADACHES, PREVENTING, TRIGGERS (ENGLISH)    HEADACHE, MIGRAINE: STAGES AND TREATMENT (ENGLISH)      24 Hour Appointment Hotline       To make an appointment  at any [facility] clinic, call [phone] ([phone]). If you don't have a family doctor or clinic, we will help you find one. [facility] clinics are conveniently located to serve the needs of you and your family.             Review of your medicines      START taking        Dose / Directions Last dose taken    SUMAtriptan 25 MG tablet   Commonly known as:  IMITREX   Quantity:  8 tablet        Take 25-50mg one time. May repeat 25mg every two hours up to a maximum of 100mg in 24 hours.   Refills:  0          Our records show that you are taking the medicines listed below. If these are incorrect, please call your family doctor or clinic.        Dose / Directions Last dose taken    gabapentin 300 MG capsule   Commonly known as:  NEURONTIN   Dose:  300 mg   Quantity:  90 capsule        Take 1 capsule (300 mg) by mouth 3 times daily   Refills:  1        IBUPROFEN PO   Dose:  800 mg   Indication:  Mild to Moderate Pain        Take 800 mg by mouth every 4 hours as needed   Refills:  0        paragard intrauterine copper   Dose:  1 each   Quantity:  1 Device        1 each by Intrauterine route once.   Refills:  0                Prescriptions were sent or printed at these locations (1 Prescription)                   [facility]   [address] [address]    Telephone:  [phone]   Fax:  [phone]   Hours:                  Printed at Department/Unit printer (1 of 1)         SUMAtriptan (IMITREX) 25 MG tablet                Procedures and tests performed during your visit     CBC with platelets differential    CRP inflammation    Peripheral IV: Standard      Orders Needing Specimen Collection     None      Pending Results     No orders found from 7/30/2017 to 8/2/2017.            Pending Culture Results     No orders found from 7/30/2017 to 8/2/2017.            Pending Results Instructions     If you had any lab results that were not finalized at the time of your  Discharge, you can call the ED Lab Result RN at [phone]. You will be contacted by this team for any positive Lab results or changes in treatment. The nurses are available 7 days a week from 10A to 6:30P.  You can leave a message 24 hours per day and they will return your call.        Thank you for choosing [facility]       Thank you for choosing [facility] for your care. Our goal is always to provide you with excellent care. Hearing back from our patients is one way we can continue to improve our services. Please take a few minutes to complete the written survey that you may receive in the mail after you visit with us. Thank you!        [employer] Information     [employer] gives you secure access to your electronic health record. If you see a primary care provider, you can also send messages to your care team and make appointments. If you have questions, please call your primary care clinic.  If you do not have a primary care provider, please call [phone] and they will assist you.        Care EveryWhere ID     This is your Care EveryWhere ID. This could be used by other organizations to access your [facility] medical records  [id number]        Equal Access to Services     SUZAN WILCOX : Hadii antwon Iqbal, veena jackson, collin storm, bhupendra matos . So [facility] [phone].    ATENCIÓN: Si habla español, tiene a zambrano disposición servicios gratuitos de asistencia lingüística. Llame al [phone].    We comply with applicable federal civil rights laws and [place] laws. We do not discriminate on the basis of race, color, national origin, age, disability sex, sexual orientation or gender identity.            After Visit Summary       This is your record. Keep this with you and show to your community pharmacist(s) and doctor(s) at your next visit.

## 2017-08-01 NOTE — ED NOTES
Has been having pain to her face for the past week, states she has been having about every 3 months. States her teeth are sensitive and it hurts to bit down. Also states she feels like her face is swollen.

## 2017-08-01 NOTE — ED PROVIDER NOTES
History     Chief Complaint   Patient presents with     Facial Pain     HPI  Janeth Argueta is a 32 year old female who presents to the ER with concerns about it onset and return of her recurrent episodes of trigeminal neuralgia type pain.  He states that she typically gets a pain in the right face but today at simple sides.  She describes the pain as a nerve pain and has been present for one week.  He states that she is prescribed Neurontin for this pain but admits she has not been taking it regularly.  She denies any fever or chills.  Denies any recent fall or head injury.  She states that her only other medication is her IUD.  She denies any numbness or tingling to her extremities.  She denies any nausea or vomiting symptoms.      I have reviewed the Medications, Allergies, Past Medical and Surgical History, and Social History in the Epic system.    Patient Active Problem List   Diagnosis     Calculus of kidney     CARDIOVASCULAR SCREENING; LDL GOAL LESS THAN 160     GERD (gastroesophageal reflux disease)     Anxiety     Acute gouty arthropathy     Tobacco abuse     Major depressive disorder, recurrent episode, mild (H)       Past Medical History:   Diagnosis Date     Calculus of kidney ages 13 and 22     Tobacco abuse 2015        Past Surgical History:   Procedure Laterality Date     C OPEN RX ELBOW DISLOCATION  high school    fracture right elbow, open fixation      SECTION  2011    Procedure: SECTION; Surgeon:LACHO VALENTIN; Location:[facility] OR     HC TOOTH EXTRACTION W/FORCEP  age 17    wisdom tooth extraction     LITHOTRIPSY  2008    Left lithotripsy. [facility].     ORTHOPEDIC SURGERY         Family History   Problem Relation Age of Onset     Hypertension Mother      Musculoskeletal Disorder Mother      Congenital hip disorder     Genitourinary Problems Father      Kidney stones on dad's side     Respiratory Father      COPD - he was a smoker     DIABETES Paternal  Grandmother      CEREBROVASCULAR DISEASE Paternal Grandmother      Cardiovascular Paternal Grandmother      Congenital Anomalies Other      cousin's baby has truncus arteriosus       Social History     Social History     Marital status:      Spouse name: Umair     Number of children: 2     Years of education: N/A     Occupational History      [employer]     Social History Main Topics     Smoking status: Former Smoker     Packs/day: 0.50     Years: 5.00     Types: Cigarettes     Quit date: 1/1/2010     Smokeless tobacco: Never Used     Alcohol use No     Drug use: No      Comment: THC in the past     Sexual activity: Yes     Partners: Male     Birth control/ protection: IUD     Other Topics Concern      Service No     Blood Transfusions No     Caffeine Concern No     Occupational Exposure Yes     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 No smokers in the home.  Has one indoor cat.  Advised about toxoplasmosis precautions.  No concerns about domestic violence.       Current Outpatient Rx   Medication Sig Dispense Refill     SUMAtriptan (IMITREX) 25 MG tablet Take 25-50mg one time. May repeat 25mg every two hours up to a maximum of 100mg in 24 hours. 8 tablet 0     IBUPROFEN PO Take 800 mg by mouth every 4 hours as needed        gabapentin (NEURONTIN) 300 MG capsule Take 1 capsule (300 mg) by mouth 3 times daily 90 capsule 1     IUD's (PARAGARD INTRAUTERINE COPPER) 1 each by Intrauterine route once. 1 Device 0       Allergies   Allergen Reactions     No Known Allergies        Immunization History   Administered Date(s) Administered     Influenza (IIV3) 10/24/2007, 12/26/2011, 10/26/2012     TDAP Vaccine (Adacel) 12/26/2011       Review  of Systems   Constitutional: Positive for fatigue.   HENT: Negative for hearing loss.    Eyes: Positive for photophobia.   Respiratory: Negative.    Cardiovascular: Negative.    Gastrointestinal: Negative.    Genitourinary: Negative.    Musculoskeletal: Negative.    Skin: Negative for rash.   Neurological: Positive for headaches. Negative for weakness and numbness.   Psychiatric/Behavioral: Negative.    All other systems reviewed and are negative.      Physical Exam   BP: 126/78  Pulse: 72  Temp: 98.1  F (36.7  C)  Resp: 20  Weight: 85.7 kg (189 lb)  SpO2: 100 %  Physical Exam   Constitutional: She is oriented to person, place, and time. She appears well-developed and well-nourished. She appears distressed.   HENT:   Head: Normocephalic and atraumatic.   Patient without evidence for facial palsy or weakness to the facial musculature.   Eyes: Conjunctivae and EOM are normal. Pupils are equal, round, and reactive to light.   She is mildly photophobic on initial examination.   Neck: Normal range of motion. Neck supple. No JVD present.   Cardiovascular: Normal rate, normal heart sounds and intact distal pulses.  Exam reveals no friction rub.    Pulmonary/Chest: Effort normal. No respiratory distress.   Musculoskeletal: Normal range of motion.   Neurological: She is alert and oriented to person, place, and time. No cranial nerve deficit. Coordination normal.   Skin: Skin is warm. No rash noted.   Psychiatric: She has a normal mood and affect. Her speech is normal and behavior is normal. Thought content normal. Cognition and memory are normal.   Nursing note and vitals reviewed.      ED Course     ED Course     Procedures             Critical Care time:  none               Results for orders placed or performed during the hospital encounter of 08/01/17 (from the past 24 hour(s))   CBC with platelets differential   Result Value Ref Range    WBC 11.0 4.0 - 11.0 10e9/L    RBC Count 3.88 3.8 - 5.2 10e12/L    Hemoglobin 12.7  11.7 - 15.7 g/dL    Hematocrit 40.0 35.0 - 47.0 %     (H) 78 - 100 fl    MCH 32.7 26.5 - 33.0 pg    MCHC 31.8 31.5 - 36.5 g/dL    RDW 12.7 10.0 - 15.0 %    Platelet Count 196 150 - 450 10e9/L    Diff Method Automated Method     % Neutrophils 57.6 %    % Lymphocytes 29.8 %    % Monocytes 8.0 %    % Eosinophils 3.5 %    % Basophils 0.7 %    % Immature Granulocytes 0.4 %    Absolute Neutrophil 6.0 1.6 - 8.3 10e9/L    Absolute Lymphocytes 3.1 0.8 - 5.3 10e9/L    Absolute Monocytes 0.8 0.0 - 1.3 10e9/L    Absolute Eosinophils 0.4 0.0 - 0.7 10e9/L    Absolute Basophils 0.1 0.0 - 0.2 10e9/L    Abs Immature Granulocytes 0.0 0 - 0.4 10e9/L   CRP inflammation   Result Value Ref Range    CRP Inflammation <2.9 0.0 - 8.0 mg/L       Medications ordered to be administered in the ER:    Medications   0.9% sodium chloride BOLUS (0 mLs Intravenous Stopped 8/1/17 0211)   diphenhydrAMINE (BENADRYL) injection 25 mg (25 mg Intravenous Given 8/1/17 0114)   ketorolac (TORADOL) injection 30 mg (30 mg Intravenous Given 8/1/17 0112)   prochlorperazine (COMPAZINE) injection 7.5 mg (7.5 mg Intravenous Given 8/1/17 0115)         Assessments & Plan (with Medical Decision Making)  Patient with concerns about recurrence of her trigeminal neuralgia oversedated as different in that it involves both sides of her face at the same time.  I suggested this is unlikely to be trigeminal neuralgia and the fact that it is bilateral and felt this is likely more consistent with a migraine-type headache.  We have elected to trial of treatment for migraine to see if we can get some control of her symptoms and if unable to improved and may need further evaluation and workup.  2:11 AM  Sleeping  2:39 AM  Patient awakened and reexamined.  She states her headache is resolved but would like to continue sleeping.    Intervention eventually awakened and felt much improved.  Symptoms consistent with migraine cephalgia.  She is given a prescription for Imitrex to  use should she have similar symptoms in the future.  She was given verbal and written instructions discussing migraine and potential preventative measures.       I have reviewed the nursing notes.    I have reviewed the findings, diagnosis, plan and need for follow up with the patient.       Discharge Medication List as of 8/1/2017  3:14 AM      START taking these medications    Details   SUMAtriptan (IMITREX) 25 MG tablet Take 25-50mg one time. May repeat 25mg every two hours up to a maximum of 100mg in 24 hours., Disp-8 tablet, R-0, Local Print                  I verbally discussed the findings of the evaluation today in the ER. I have verbally discussed with Janeth the suggested treatment(s) as described in the discharge instructions and handouts. I have prescribed the above listed medications and instructed her on appropriate use of these medications.      I have verbally suggested she follow-up in her clinic or return to the ER for increased symptoms. See the follow-up recommendations documented  in the after visit summary in this visit's EPIC chart.    Final diagnoses:   Intractable periodic headache syndrome       8/1/2017   [facility] EMERGENCY DEPARTMENT     Ramiro Rodriguez, DO  08/01/17 1946       Ramiro Rodriguez, DO  08/01/17 1947

## 2017-08-01 NOTE — ED AVS SNAPSHOT
[facility] Emergency Department    [address] [place] [address]    Phone:  [phone]    Fax:  [phone]                                       Janeth Argueta   MRN: [id number]    Department:  [facility] Emergency Department   Date of Visit:  8/1/2017           After Visit Summary Signature Page     I have received my discharge instructions, and my questions have been answered. I have discussed any challenges I see with this plan with the nurse or doctor.    ..........................................................................................................................................  Patient/Patient Representative Signature      ..........................................................................................................................................  Patient Representative Print Name and Relationship to Patient    ..................................................               ................................................  Date                                            Time    ..........................................................................................................................................  Reviewed by Signature/Title    ...................................................              ..............................................  Date                                                            Time

## 2017-08-01 NOTE — DISCHARGE INSTRUCTIONS
Please read and follow the handout(s) instructions. Return, if needed, for increased or worsening symptoms and as directed by the handout(s).    See the prescription medication for use should the headache return. Use the medication as soon as possible after the headache starts as the medication works best in the early stages of the headache.    Please make an appointment with Dr. Mckee for recheck.    I'm glad you are feeling improved.     Electronically signed, Ramiro Rodriguez DO

## 2017-08-01 NOTE — ED NOTES
"Patient c/o \"nerve pain\" to her face x 1 week. She has been taking Neurontin with this pain, but hasn't been taking regularly.  "

## 2017-08-02 ENCOUNTER — CARE COORDINATION (OUTPATIENT)
Dept: CARE COORDINATION | Facility: CLINIC | Age: 32
End: 2017-08-02

## 2017-08-02 NOTE — PROGRESS NOTES
Clinic Care Coordination Contact  [facility]/Voicemail    Referral Source: ED Follow-Up    Clinical Data: Care Coordinator Outreach    Outreach attempted x 1.  Left message on voicemail with call back information and requested return call.    Plan: Care Coordinator will mail out care coordination introduction letter with care coordinator contact information and explanation of care coordination services. Care Coordinator will try to reach patient again in 1-2 business days.      Diamante Patel RN BSN, PHN RN Care Coordinator  [facility]  [email or web address]  [phone]  8/2/2017 2:49 PM

## 2017-08-03 NOTE — PROGRESS NOTES
Clinic Care Coordination Contact  [facility]/Voicemail    Referral Source: ED Follow-Up  Clinical Data: Care Coordinator Outreach  Outreach attempted x 2.  Left message on voicemail with call back information and requested return call.  Plan: Care Coordinator mailed out care coordination introduction letter on 8/2/17. Care Coordinator will try to reach patient again in 3-5 business days.      Diamante Patel, RN BSN, PHN RN Care Coordinator  [facility]  [email or web address]  [phone]  8/3/2017 9:59 AM

## 2017-08-10 NOTE — PROGRESS NOTES
"Clinic Care Coordination Contact  Care Team Conversations    Chart reviewed.  Noted that CC RN had attempted outreach x's 2 with no response from patient.  Will close to CC at this time.  Status updated to \"inactive\".    Diamante Patel RN BSN, PHN RN Care Coordinator  [facility]  [email or web address]  [phone]  8/10/2017 2:27 PM        "

## 2017-08-26 ENCOUNTER — HEALTH MAINTENANCE LETTER (OUTPATIENT)
Age: 32
End: 2017-08-26

## 2017-11-10 ENCOUNTER — HOSPITAL ENCOUNTER (EMERGENCY)
Facility: CLINIC | Age: 32
Discharge: HOME | End: 2017-11-10
Attending: EMERGENCY MEDICINE | Admitting: EMERGENCY MEDICINE
Payer: COMMERCIAL

## 2017-11-10 VITALS
DIASTOLIC BLOOD PRESSURE: 79 MMHG | TEMPERATURE: 97.9 F | SYSTOLIC BLOOD PRESSURE: 102 MMHG | RESPIRATION RATE: 18 BRPM | OXYGEN SATURATION: 100 % | HEART RATE: 70 BPM | WEIGHT: 188 LBS | HEIGHT: 63 IN | BODY MASS INDEX: 33.31 KG/M2

## 2017-11-10 DIAGNOSIS — K08.89 PAIN, DENTAL: ICD-10-CM

## 2017-11-10 DIAGNOSIS — K04.7 DENTAL ABSCESS: ICD-10-CM

## 2017-11-10 PROCEDURE — 64450 NJX AA&/STRD OTHER PN/BRANCH: CPT | Mod: Z6 | Performed by: EMERGENCY MEDICINE

## 2017-11-10 PROCEDURE — 99284 EMERGENCY DEPT VISIT MOD MDM: CPT | Mod: 25 | Performed by: EMERGENCY MEDICINE

## 2017-11-10 PROCEDURE — 64450 NJX AA&/STRD OTHER PN/BRANCH: CPT | Performed by: EMERGENCY MEDICINE

## 2017-11-10 PROCEDURE — 99283 EMERGENCY DEPT VISIT LOW MDM: CPT | Mod: 25 | Performed by: EMERGENCY MEDICINE

## 2017-11-10 RX ORDER — AMOXICILLIN 500 MG/1
500 CAPSULE ORAL 3 TIMES DAILY
Qty: 21 CAPSULE | Refills: 0 | Status: SHIPPED | OUTPATIENT
Start: 2017-11-10 | End: 2017-11-17

## 2017-11-10 RX ORDER — HYDROCODONE BITARTRATE AND ACETAMINOPHEN 5; 325 MG/1; MG/1
1-2 TABLET ORAL EVERY 6 HOURS PRN
Qty: 12 TABLET | Refills: 0 | Status: SHIPPED | OUTPATIENT
Start: 2017-11-10 | End: 2017-12-07

## 2017-11-10 RX ORDER — BUPIVACAINE HYDROCHLORIDE AND EPINEPHRINE 5; 5 MG/ML; UG/ML
0-1.8 INJECTION, SOLUTION PERINEURAL ONCE
Status: DISCONTINUED | OUTPATIENT
Start: 2017-11-10 | End: 2017-11-10 | Stop reason: HOSPADM

## 2017-11-10 NOTE — ED AVS SNAPSHOT
[facility] Emergency Department    [address] [place] [address]    Phone:  [phone]    Fax:  [phone]                                       Janeth Argueta   MRN: [id number]    Department:  [facility] Emergency Department   Date of Visit:  11/10/2017           After Visit Summary Signature Page     I have received my discharge instructions, and my questions have been answered. I have discussed any challenges I see with this plan with the nurse or doctor.    ..........................................................................................................................................  Patient/Patient Representative Signature      ..........................................................................................................................................  Patient Representative Print Name and Relationship to Patient    ..................................................               ................................................  Date                                            Time    ..........................................................................................................................................  Reviewed by Signature/Title    ...................................................              ..............................................  Date                                                            Time

## 2017-11-10 NOTE — ED AVS SNAPSHOT
[facility] Emergency Department    [address] [place] [address]    Phone:  [phone]    Fax:  [phone]                                       Janeth Argueta   MRN: [id number]    Department:  [facility] Emergency Department   Date of Visit:  11/10/2017           Patient Information     Date Of Birth          1985        Your diagnoses for this visit were:     Pain, dental     Dental abscess        You were seen by Kesha Akers MD.      Follow-up Information     Follow up with Patsy Mckee MD.    Specialty:  Family Practice    Contact information:    [address] [place] [id number] [phone]          Discharge Instructions       Ibuprofen for pain and inflammation.    Vicodin for severe pain.    Antibiotics as prescribed.    Call today to make a dental appointment.    Return for concerns.    I hope you are much better quickly!!    Discharge References/Attachments     DENTAL ABSCESS (ENGLISH)      24 Hour Appointment Hotline       To make an appointment at any [facility], call [phone] ([phone]). If you don't have a family doctor or clinic, we will help you find one. [facility] are conveniently located to serve the needs of you and your family.             Review of your medicines      START taking        Dose / Directions Last dose taken    amoxicillin 500 MG capsule   Commonly known as:  AMOXIL   Dose:  500 mg   Quantity:  21 capsule        Take 1 capsule (500 mg) by mouth 3 times daily for 7 days   Refills:  0        HYDROcodone-acetaminophen 5-325 MG per tablet   Commonly known as:  NORCO   Dose:  1-2 tablet   Quantity:  12 tablet        Take 1-2 tablets by mouth every 6 hours as needed for moderate to severe pain   Refills:  0          Our records show that you are taking the medicines listed below. If these are incorrect, please call your family doctor or clinic.        Dose / Directions Last dose taken     IBUPROFEN PO   Dose:  800 mg   Indication:  Mild to Moderate Pain        Take 800 mg by mouth every 4 hours as needed   Refills:  0        paragard intrauterine copper   Dose:  1 each   Quantity:  1 Device        1 each by Intrauterine route once.   Refills:  0                Prescriptions were sent or printed at these locations (2 Prescriptions)                   Other Prescriptions                Printed at Department/Unit printer (2 of 2)         HYDROcodone-acetaminophen (NORCO) 5-325 MG per tablet               amoxicillin (AMOXIL) 500 MG capsule                Orders Needing Specimen Collection     None      Pending Results     No orders found from 11/8/2017 to 11/11/2017.            Pending Culture Results     No orders found from 11/8/2017 to 11/11/2017.            Pending Results Instructions     If you had any lab results that were not finalized at the time of your Discharge, you can call the ED Lab Result RN at [phone]. You will be contacted by this team for any positive Lab results or changes in treatment. The nurses are available 7 days a week from 10A to 6:30P.  You can leave a message 24 hours per day and they will return your call.        Thank you for choosing [facility]       Thank you for choosing [facility] for your care. Our goal is always to provide you with excellent care. Hearing back from our patients is one way we can continue to improve our services. Please take a few minutes to complete the written survey that you may receive in the mail after you visit with us. Thank you!        [facility] Information     [facility] gives you secure access to your electronic health record. If you see a primary care provider, you can also send messages to your care team and make appointments. If you have questions, please call your primary care clinic.  If you do not have a primary care provider, please call [phone] and they will assist you.        Care EveryWhere ID     This is your Care EveryWhere ID.  This could be used by other organizations to access your [facility] medical records  [id number]        Equal Access to Services     SUZAN WILCOX : Hadii antwon Iqbal, veena jackson, collin storm, bhupendra kong. So [facility] [phone].    ATENCIÓN: Si habla español, tiene a zambrano disposición servicios gratuitos de asistencia lingüística. Llame al [phone].    We comply with applicable federal civil rights laws and [place] laws. We do not discriminate on the basis of race, color, national origin, age, disability, sex, sexual orientation, or gender identity.            After Visit Summary       This is your record. Keep this with you and show to your community pharmacist(s) and doctor(s) at your next visit.

## 2017-11-10 NOTE — ED PROVIDER NOTES
History     Chief Complaint   Patient presents with     Facial Pain     The history is provided by the patient and medical records.     This is a 32-year-old female with history of kidney stones, GERD, anxiety/depression and gout presenting with facial pain. Patient started having pain on the right side of her face about 4 days ago. She notes that she has a very tender tooth. The tooth has been broken for an extended period of time. No facial swelling or drainage. No fevers or chills.  Of note, patient has been seen in number of different times for right-sided facial pain over the last year. There was some evidence for possible trigeminal neuralgia. She also had right sided pain that was thought to be a migraine in 2017. She has never followed up with neurology. In addition, she has not had any recent dental care. Patient does smoke. No other complaints.    Problem List:    Patient Active Problem List    Diagnosis Date Noted     Major depressive disorder, recurrent episode, mild (H) 2015     Priority: Medium     Tobacco abuse 2015     Priority: Medium     Acute gouty arthropathy 2014     Priority: Medium     Anxiety 2012     Priority: Medium     GERD (gastroesophageal reflux disease) 2011     Priority: Medium     CARDIOVASCULAR SCREENING; LDL GOAL LESS THAN 160 10/31/2010     Priority: Medium     Calculus of kidney 2007     Priority: Medium     At 24 weeks. Passed, right sided.           Past Medical History:    Past Medical History:   Diagnosis Date     Calculus of kidney ages 13 and 22     Tobacco abuse 2015       Past Surgical History:    Past Surgical History:   Procedure Laterality Date     C OPEN RX ELBOW DISLOCATION  high school    fracture right elbow, open fixation      SECTION  2011    Procedure: SECTION; Surgeon:LACHO VALENTIN; Location: OR      TOOTH EXTRACTION W/FORCEP  age 17    wisdom tooth extraction     LITHOTRIPSY   "6/18/2008    Left lithotripsy. [facility].     ORTHOPEDIC SURGERY         Family History:    Family History   Problem Relation Age of Onset     Hypertension Mother      Musculoskeletal Disorder Mother      Congenital hip disorder     Genitourinary Problems Father      Kidney stones on dad's side     Respiratory Father      COPD - he was a smoker     DIABETES Paternal Grandmother      CEREBROVASCULAR DISEASE Paternal Grandmother      Cardiovascular Paternal Grandmother      Congenital Anomalies Other      cousin's baby has truncus arteriosus       Social History:  Marital Status:   [2]  Social History   Substance Use Topics     Smoking status: Current Every Day Smoker     Packs/day: 0.50     Years: 5.00     Types: Cigarettes     Last attempt to quit: 1/1/2010     Smokeless tobacco: Never Used     Alcohol use No        Medications:      HYDROcodone-acetaminophen (NORCO) 5-325 MG per tablet   amoxicillin (AMOXIL) 500 MG capsule   IBUPROFEN PO   IUD's (PARAGARD INTRAUTERINE COPPER)     Review of Systems   All other ROS reviewed and are negative or non-contributory except as stated in HPI.     Physical Exam   BP: 102/79  Pulse: 70  Temp: 97.9  F (36.6  C)  Resp: 18  Height: 160 cm (5' 3\")  Weight: 85.3 kg (188 lb)  SpO2: 100 %      Physical Exam   Constitutional: She appears well-developed and well-nourished.   Uncomfortable appearing female sitting in the bed. Warm pack to the side of her face.   HENT:   Head: Normocephalic.   Right Ear: External ear normal.   Left Ear: External ear normal.   Nose: Nose normal.   Mouth/Throat:       Numerous dental caries and some broken teeth.   Eyes: Conjunctivae and EOM are normal. Pupils are equal, round, and reactive to light.   Neck: Normal range of motion. Neck supple.   Cardiovascular: Normal rate and regular rhythm.    Pulmonary/Chest: Effort normal.   Musculoskeletal: Normal range of motion. She exhibits no edema.   Neurological: She is alert. She exhibits " "normal muscle tone.   Skin: Skin is warm and dry. She is not diaphoretic.   Psychiatric: She has a normal mood and affect. Her behavior is normal.   Vitals reviewed.      ED Course (with Medical Decision Making)    Pt seen and examined by me.  RN and EPIC notes reviewed.      Patient with facial pain/dental pain. She feels that this is most related to her tooth rather than the previous \"trigeminal neuralgia\". There is no facial droop or evidence for rash. We are going to try with dental block to see if this improves the pain.     1.8 cc of 0.25% bupivacaine with epinephrine was injected to provide a right-sided inferior alveolar nerve block after verbal consent given. Patient had moderate relief of her pain. She still feels that there is pain \"inside the tooth\".   A repeat injection of the same amount was used to provide apical anesthesia to the tooth with improvement in her pain control.     Plan Rx with small amount of Vicodin, amoxicillin, Ibuprofen for pain. Make an appointment to be seen by dentistry. Return for worsening, changes or concerns.       Procedures      Assessments & Plan     I have reviewed the findings, diagnosis, plan and need for follow up with the patient.    Discharge Medication List as of 11/10/2017  9:22 AM      START taking these medications    Details   HYDROcodone-acetaminophen (NORCO) 5-325 MG per tablet Take 1-2 tablets by mouth every 6 hours as needed for moderate to severe pain, Disp-12 tablet, R-0, Local Print      amoxicillin (AMOXIL) 500 MG capsule Take 1 capsule (500 mg) by mouth 3 times daily for 7 days, Disp-21 capsule, R-0, Local Print             Final diagnoses:   Pain, dental   Dental abscess     Disposition: Patient discharged home in stable condition. Plan as above. Return for concerns.     Note: Chart documentation done in part with Dragon Voice Recognition software. Although reviewed after completion, some word and grammatical errors may remain.       11/10/2017 "   [facility] EMERGENCY DEPARTMENT     Kesha Akers MD  11/10/17 [id number]

## 2017-11-10 NOTE — ED NOTES
She has pain on the right side of her face that started 4 days ago.  She thinks it is from a tooth.

## 2017-11-10 NOTE — DISCHARGE INSTRUCTIONS
Ibuprofen for pain and inflammation.    Vicodin for severe pain.    Antibiotics as prescribed.    Call today to make a dental appointment.    Return for concerns.    I hope you are much better quickly!!

## 2017-12-07 ENCOUNTER — OFFICE VISIT (OUTPATIENT)
Dept: URGENT CARE | Facility: RETAIL CLINIC | Age: 32
End: 2017-12-07
Payer: COMMERCIAL

## 2017-12-07 VITALS
SYSTOLIC BLOOD PRESSURE: 102 MMHG | HEART RATE: 62 BPM | TEMPERATURE: 98 F | OXYGEN SATURATION: 99 % | DIASTOLIC BLOOD PRESSURE: 60 MMHG

## 2017-12-07 DIAGNOSIS — J00 COMMON COLD: ICD-10-CM

## 2017-12-07 DIAGNOSIS — J02.9 ACUTE PHARYNGITIS, UNSPECIFIED ETIOLOGY: Primary | ICD-10-CM

## 2017-12-07 LAB — S PYO AG THROAT QL IA.RAPID: NORMAL

## 2017-12-07 PROCEDURE — 87081 CULTURE SCREEN ONLY: CPT | Performed by: NURSE PRACTITIONER

## 2017-12-07 PROCEDURE — 99203 OFFICE O/P NEW LOW 30 MIN: CPT | Performed by: NURSE PRACTITIONER

## 2017-12-07 PROCEDURE — 87880 STREP A ASSAY W/OPTIC: CPT | Mod: QW | Performed by: NURSE PRACTITIONER

## 2017-12-07 NOTE — NURSING NOTE
"Chief Complaint   Patient presents with     Pharyngitis     sore throat x 3 days- son was postive yesterday for strep       Initial /60 (BP Location: Right arm, Patient Position: Chair, Cuff Size: Adult Regular)  Pulse 62  Temp 98  F (36.7  C) (Tympanic)  LMP 10/27/2017 (Approximate)  SpO2 99% Estimated body mass index is 33.3 kg/(m^2) as calculated from the following:    Height as of 11/10/17: 5' 3\" (1.6 m).    Weight as of 11/10/17: 188 lb (85.3 kg).  Medication Reconciliation: complete     Jessica Sundet      "

## 2017-12-07 NOTE — MR AVS SNAPSHOT
After Visit Summary   12/7/2017    Janeth Argueta    MRN: [id number]           Patient Information     Date Of Birth          1985        Visit Information        Provider Department      12/7/2017 6:40 PM Irwin Simmons APRN CNP [facility]        Today's Diagnoses     Acute pharyngitis, unspecified etiology    -  1    Common cold           Follow-ups after your visit        Your next 10 appointments already scheduled     Dec 07, 2017  6:40 PM CST   SHORT with ROXANA Bonilla CNP   [facility] ([facility])    [address]  [facility] [id number] [phone]              Who to contact     You can reach your care team any time of the day by calling [phone].  Notification of test results:  If you have an abnormal lab result, we will notify you by phone as soon as possible.         Additional Information About Your Visit        MyChart Information     [email or web address] gives you secure access to your electronic health record. If you see a primary care provider, you can also send messages to your care team and make appointments. If you have questions, please call your primary care clinic.  If you do not have a primary care provider, please call [phone] and they will assist you.        Care EveryWhere ID     This is your Care EveryWhere ID. This could be used by other organizations to access your [place] medical records  [id number]        Your Vitals Were     Pulse Temperature Last Period Pulse Oximetry          62 98  F (36.7  C) (Tympanic) 10/27/2017 (Approximate) 99%         Blood Pressure from Last 3 Encounters:   12/07/17 102/60   11/10/17 102/79   08/01/17 103/63    Weight from Last 3 Encounters:   11/10/17 188 lb (85.3 kg)   08/01/17 189 lb (85.7 kg)   06/05/17 165 lb (74.8 kg)              We Performed the Following     BETA STREP GROUP A R/O CULTURE     RAPID STREP SCREEN        Primary Care Provider Office Phone #  Fax #    Patsy Neli Mckee [phone] [address] [place] [address]        Equal Access to Services     SUZAN WILCOX : Hadii aad ku hadneptalicorby Sowashingtonali, wabereketda luqadaha, qadanutata kaalmada dotty, bhupendra landjuan moreaumac chau laleathajuan luann. So [facility] [phone].    ATENCIÓN: Si habla español, tiene a zambrano disposición servicios gratuitos de asistencia lingüística. Llame al [phone].    We comply with applicable federal civil rights laws and [place] laws. We do not discriminate on the basis of race, color, national origin, age, disability, sex, sexual orientation, or gender identity.            Thank you!     Thank you for choosing [facility]  for your care. Our goal is always to provide you with excellent care. Hearing back from our patients is one way we can continue to improve our services. Please take a few minutes to complete the written survey that you may receive in the mail after your visit with us. Thank you!             Your Updated Medication List - Protect others around you: Learn how to safely use, store and throw away your medicines at www.disposemymeds.org.          This list is accurate as of: 12/7/17  6:17 PM.  Always use your most recent med list.                   Brand Name Dispense Instructions for use Diagnosis    IBUPROFEN PO      Take 800 mg by mouth every 4 hours as needed        paragard intrauterine copper     1 Device    1 each by Intrauterine route once.    Contraception

## 2017-12-08 NOTE — PROGRESS NOTES
[facility] clinic note    SUBJECTIVE:  Janeth Argueta is a 32 year old female who presents to [facility] with chief complaint of sore throat.    Onset of symptoms was 3 day(s) ago.    Course of illness: gradual onset and worsening.    Severity moderate  Course of illness:  Current and Associated symptoms: fever, chills, cough - non-productive, ear pain (hurt to wear ear plugs at work), sore throat, hoarse voice, facial pain/pressure, headache and fatigue  Treatment measures tried at home include Tylenol/Ibuprofen.  Predisposing factors include exposure to strep.    Current Outpatient Prescriptions   Medication     IUD's (PARAGARD INTRAUTERINE COPPER)     IBUPROFEN PO     No current facility-administered medications for this visit.      PAST MEDICAL HISTORY:   Past Medical History:   Diagnosis Date     Calculus of kidney ages 13 and 22     Tobacco abuse 2015       PAST SURGICAL HISTORY:   Past Surgical History:   Procedure Laterality Date     C OPEN RX ELBOW DISLOCATION  high school    fracture right elbow, open fixation      SECTION  2011    Procedure: SECTION; Surgeon:LACHO VALENTIN; Location: OR      TOOTH EXTRACTION W/FORCEP  age 17    wisdom tooth extraction     LITHOTRIPSY  2008    Left lithotripsy. [facility].     ORTHOPEDIC SURGERY         FAMILY HISTORY:   Family History   Problem Relation Age of Onset     Hypertension Mother      Musculoskeletal Disorder Mother      Congenital hip disorder     Genitourinary Problems Father      Kidney stones on dad's side     Respiratory Father      COPD - he was a smoker     DIABETES Paternal Grandmother      CEREBROVASCULAR DISEASE Paternal Grandmother      Cardiovascular Paternal Grandmother      Congenital Anomalies Other      cousin's baby has truncus arteriosus       SOCIAL HISTORY:   Social History   Substance Use Topics     Smoking status: Current Every Day Smoker      Packs/day: 0.50     Years: 5.00     Types: Cigarettes     Last attempt to quit: 1/1/2010     Smokeless tobacco: Never Used     Alcohol use No       ROS:  Review of systems negative except as stated above.    OBJECTIVE:   Vitals:    12/07/17 1745   BP: 102/60   BP Location: Right arm   Patient Position: Chair   Cuff Size: Adult Regular   Pulse: 62   Temp: 98  F (36.7  C)   TempSrc: Tympanic   SpO2: 99%     GENERAL APPEARANCE: alert, active, mild distress and cooperative  EYES: EOMI,  PERRL, conjunctiva clear  HENT: ear canals and TM's normal, /c some cerumen.  Nose mildly congested.  Pharynx slightly erythematous noted.  NECK: bilateral anterior cervical adenopathy  RESP: lungs clear to auscultation - no rales, rhonchi or wheezes  CV: regular rates and rhythm, normal S1 S2, no murmur noted  ABDOMEN:  soft, nontender, no HSM or masses and bowel sounds normal  SKIN: no suspicious lesions or rashes    Rapid Strep test is negative; await throat culture results.    ASSESSMENT:   Acute pharyngitis, unspecified etiology  Common cold      PLAN:   Outpatient Encounter Prescriptions as of 12/7/2017   Medication Sig Dispense Refill     IUD's (PARAGARD INTRAUTERINE COPPER) 1 each by Intrauterine route once. 1 Device 0     [DISCONTINUED] HYDROcodone-acetaminophen (NORCO) 5-325 MG per tablet Take 1-2 tablets by mouth every 6 hours as needed for moderate to severe pain 12 tablet 0     IBUPROFEN PO Take 800 mg by mouth every 4 hours as needed        No facility-administered encounter medications on file as of 12/7/2017.      If not improving Follow up at:  [facility] [phone]  Encourage good hydration (mainly water), may drink tea /c honey, warm chicken broth to sooth throat.  Soft foods may be preferred for several days.  Symptomatic treatment with warm Na+ H2O gargles, OTC analgesic, etc. discussed.   Strep culture pending.   Janeth Argueta told positive cultures called only.  Rest as  needed.  Follow-up with primary care provider if not improving.    If difficulty breathing or swallowing be seen immediately in the ED.    Irwin Simmons MSN, APRN, Family NP-C  [facility]

## 2017-12-09 LAB — BETA STREP CONFIRM: NORMAL

## 2018-03-19 ENCOUNTER — OFFICE VISIT (OUTPATIENT)
Dept: URGENT CARE | Facility: RETAIL CLINIC | Age: 33
End: 2018-03-19
Payer: COMMERCIAL

## 2018-03-19 VITALS — TEMPERATURE: 98.7 F | SYSTOLIC BLOOD PRESSURE: 105 MMHG | DIASTOLIC BLOOD PRESSURE: 65 MMHG | HEART RATE: 60 BPM

## 2018-03-19 DIAGNOSIS — J02.9 ACUTE PHARYNGITIS, UNSPECIFIED ETIOLOGY: Primary | ICD-10-CM

## 2018-03-19 DIAGNOSIS — J00 COMMON COLD: ICD-10-CM

## 2018-03-19 LAB — S PYO AG THROAT QL IA.RAPID: NORMAL

## 2018-03-19 PROCEDURE — 99213 OFFICE O/P EST LOW 20 MIN: CPT | Performed by: NURSE PRACTITIONER

## 2018-03-19 PROCEDURE — 87880 STREP A ASSAY W/OPTIC: CPT | Mod: QW | Performed by: NURSE PRACTITIONER

## 2018-03-19 PROCEDURE — 87081 CULTURE SCREEN ONLY: CPT | Performed by: NURSE PRACTITIONER

## 2018-03-19 NOTE — MR AVS SNAPSHOT
After Visit Summary   3/19/2018    Janeth Argueta    MRN: [id number]           Patient Information     Date Of Birth          1985        Visit Information        Provider Department      3/19/2018 3:00 PM Irwin Simmons APRN [facility]        Today's Diagnoses     Acute pharyngitis, unspecified etiology    -  1    Common cold           Follow-ups after your visit        Who to contact     You can reach your care team any time of the day by calling [phone].  Notification of test results:  If you have an abnormal lab result, we will notify you by phone as soon as possible.         Additional Information About Your Visit        MyChart Information     [employer] gives you secure access to your electronic health record. If you see a primary care provider, you can also send messages to your care team and make appointments. If you have questions, please call your primary care clinic.  If you do not have a primary care provider, please call [phone] and they will assist you.        Care EveryWhere ID     This is your Care EveryWhere ID. This could be used by other organizations to access your [facility] medical records  [id number]        Your Vitals Were     Pulse Temperature                60 98.7  F (37.1  C) (Oral)           Blood Pressure from Last 3 Encounters:   03/19/18 105/65   12/07/17 102/60   11/10/17 102/79    Weight from Last 3 Encounters:   11/10/17 188 lb (85.3 kg)   08/01/17 189 lb (85.7 kg)   06/05/17 165 lb (74.8 kg)              We Performed the Following     BETA STREP GROUP A R/O CULTURE     RAPID STREP SCREEN          Today's Medication Changes          These changes are accurate as of 3/19/18  3:19 PM.  If you have any questions, ask your nurse or doctor.               Start taking these medicines.        Dose/Directions    lidocaine (viscous) 2 % solution   Commonly known as:  XYLOCAINE   Used for:  Acute pharyngitis, unspecified etiology    Started by:  Irwin Simmons, ROXANA CNP        5 to 15 ml, At back of throat slowly swish and swallow or spit every 3-8 hours as needed; max 8 doses/24 hour period   Quantity:  100 mL   Refills:  0            Where to get your medicines      These medications were sent to [address] - [address]  [address], [facility] [address]     Phone:  [phone]     lidocaine (viscous) 2 % solution                Primary Care Provider Office Phone # Fax #    Patsy Neli Mckee [phone] [address] [facility]   [facility] [place] [address]        Equal Access to Services     [facility] : Hadii antwon carmona hadasho Soomaali, waaxda luqadaha, qaybta kaalmada adeegyada, bhupendra matos . So [facility] [phone].    ATENCIÓN: Si habla español, tiene a zambrano disposición servicios gratuitos de asistencia lingüística. Llame al [phone].    We comply with applicable federal civil rights laws and [place] laws. We do not discriminate on the basis of race, color, national origin, age, disability, sex, sexual orientation, or gender identity.            Thank you!     Thank you for choosing [facility]  for your care. Our goal is always to provide you with excellent care. Hearing back from our patients is one way we can continue to improve our services. Please take a few minutes to complete the written survey that you may receive in the mail after your visit with us. Thank you!             Your Updated Medication List - Protect others around you: Learn how to safely use, store and throw away your medicines at www.disposemymeds.org.          This list is accurate as of 3/19/18  3:19 PM.  Always use your most recent med list.                   Brand Name Dispense Instructions for use Diagnosis    IBUPROFEN PO      Take 800 mg by mouth every 4 hours as needed        lidocaine (viscous) 2 % solution    XYLOCAINE    100 mL    5 to 15 ml, At back of throat  slowly swish and swallow or spit every 3-8 hours as needed; max 8 doses/24 hour period    Acute pharyngitis, unspecified etiology       paragard intrauterine copper     1 Device    1 each by Intrauterine route once.    Contraception

## 2018-03-19 NOTE — PROGRESS NOTES
[facility] clinic note    SUBJECTIVE:  Janeth Argueta is a 32 year old female who presents to [facility] clinic with chief complaint of sore throat.    Onset of symptoms was 4 day(s) ago.    Course of illness: sudden onset.    Severity moderate and severe  Course of illness:  Current and Associated symptoms: fever, chills, sweats, ear pain left, sore throat, hoarse voice, fatigue.  Treatment measures tried at home include OTC Cough med & rest.  Predisposing factors include ill contact: Family member .    Current Outpatient Prescriptions   Medication     IBUPROFEN PO     IUD's (PARAGARD INTRAUTERINE COPPER)     No current facility-administered medications for this visit.      PAST MEDICAL HISTORY:   Past Medical History:   Diagnosis Date     Calculus of kidney ages 13 and 22     Tobacco abuse 2015       PAST SURGICAL HISTORY:   Past Surgical History:   Procedure Laterality Date     C OPEN RX ELBOW DISLOCATION  high school    fracture right elbow, open fixation      SECTION  2011    Procedure: SECTION; Surgeon:LACHO VALENTIN; Location:[facility] OR     HC TOOTH EXTRACTION W/FORCEP  age 17    wisdom tooth extraction     LITHOTRIPSY  2008    Left lithotripsy. [facility]     ORTHOPEDIC SURGERY         FAMILY HISTORY:   Family History   Problem Relation Age of Onset     Hypertension Mother      Musculoskeletal Disorder Mother      Congenital hip disorder     Genitourinary Problems Father      Kidney stones on dad's side     Respiratory Father      COPD - he was a smoker     DIABETES Paternal Grandmother      CEREBROVASCULAR DISEASE Paternal Grandmother      Cardiovascular Paternal Grandmother      Congenital Anomalies Other      cousin's baby has truncus arteriosus       SOCIAL HISTORY:   Social History   Substance Use Topics     Smoking status: Current Every Day Smoker     Packs/day: 0.50     Years: 5.00     Types: Cigarettes     Last  attempt to quit: 1/1/2010     Smokeless tobacco: Never Used     Alcohol use No       ROS:  Review of systems negative except as stated above.    OBJECTIVE:   Vitals:    03/19/18 1456   BP: 105/65   Pulse: 60   Temp: 98.7  F (37.1  C)   TempSrc: Oral     GENERAL APPEARANCE: alert, active, moderate distress and cooperative  EYES: EOMI,  PERRL, conjunctiva clear  HENT: ear canals and TM's normal.  Nose normal.  Pharynx mildly erythematous noted.  NECK: bilateral anterior cervical adenopathy and mild  RESP: lungs clear to auscultation - no rales, rhonchi or wheezes  CV: regular rates and rhythm, normal S1 S2, no murmur noted  ABDOMEN:  soft, nontender, no HSM or masses and bowel sounds normal  SKIN: no suspicious lesions or rashes    Rapid Strep test is negative; await throat culture results.    ASSESSMENT:   Acute pharyngitis, unspecified etiology  Common cold      PLAN:   Outpatient Encounter Prescriptions as of 3/19/2018   Medication Sig Dispense Refill     lidocaine, viscous, (XYLOCAINE) 2 % solution 5 to 15 ml, At back of throat slowly swish and swallow or spit every 3-8 hours as needed; max 8 doses/24 hour period 100 mL 0     IBUPROFEN PO Take 800 mg by mouth every 4 hours as needed        IUD's (PARAGARD INTRAUTERINE COPPER) 1 each by Intrauterine route once. 1 Device 0     No facility-administered encounter medications on file as of 3/19/2018.      If not improving Follow up at:  [facility]note 2] [phone]  Encourage good hydration (mainly water), may drink tea /c honey, warm chicken broth to sooth throat.  Soft foods may be preferred for several days.  Symptomatic treatment with warm Na+ H2O gargles, OTC analgesic, etc. discussed.   Strep culture pending.   Janeht Argueta told positive cultures called only.  Rest as needed.  Follow-up with primary care provider if not improving.    If difficulty breathing or swallowing be seen immediately in the ED.    Irwin Simmons MSN, APRN,  Family NP-C  [facility]

## 2018-03-19 NOTE — NURSING NOTE
"Chief Complaint   Patient presents with     Pharyngitis     x 4 days       Initial /65  Pulse 60  Temp 98.7  F (37.1  C) (Oral) Estimated body mass index is 33.3 kg/(m^2) as calculated from the following:    Height as of 11/10/17: 5' 3\" (1.6 m).    Weight as of 11/10/17: 188 lb (85.3 kg).  Medication Reconciliation: complete   Lucy Rizzo      "

## 2018-03-21 LAB
BACTERIA SPEC CULT: NORMAL
SPECIMEN SOURCE: NORMAL

## 2018-11-30 ENCOUNTER — HOSPITAL ENCOUNTER (EMERGENCY)
Facility: CLINIC | Age: 33
Discharge: HOME | End: 2018-11-30
Attending: EMERGENCY MEDICINE | Admitting: EMERGENCY MEDICINE
Payer: OTHER MISCELLANEOUS

## 2018-11-30 ENCOUNTER — APPOINTMENT (OUTPATIENT)
Dept: GENERAL RADIOLOGY | Facility: CLINIC | Age: 33
End: 2018-11-30
Attending: EMERGENCY MEDICINE
Payer: OTHER MISCELLANEOUS

## 2018-11-30 VITALS
WEIGHT: 180 LBS | DIASTOLIC BLOOD PRESSURE: 83 MMHG | RESPIRATION RATE: 14 BRPM | SYSTOLIC BLOOD PRESSURE: 114 MMHG | HEART RATE: 66 BPM | OXYGEN SATURATION: 98 % | BODY MASS INDEX: 31.89 KG/M2 | TEMPERATURE: 98.2 F

## 2018-11-30 DIAGNOSIS — S60.222A CONTUSION OF LEFT HAND, INITIAL ENCOUNTER: ICD-10-CM

## 2018-11-30 PROCEDURE — 99283 EMERGENCY DEPT VISIT LOW MDM: CPT | Performed by: EMERGENCY MEDICINE

## 2018-11-30 PROCEDURE — 99283 EMERGENCY DEPT VISIT LOW MDM: CPT | Mod: Z6 | Performed by: EMERGENCY MEDICINE

## 2018-11-30 PROCEDURE — 73130 X-RAY EXAM OF HAND: CPT | Mod: TC,LT

## 2018-11-30 NOTE — ED AVS SNAPSHOT
[facility] Emergency Department    [address] [place] [address]    Phone:  [phone]    Fax:  [phone]                                       Janeth Argueta   MRN: [id number]    Department:  [facility] Department   Date of Visit:  11/30/2018           Patient Information     Date Of Birth          1985        Your diagnoses for this visit were:     Contusion of left hand, initial encounter        You were seen by Kesha Akers MD.      Follow-up Information     Follow up with Patsy Mckee MD.    Specialty:  Family Practice    Why:  As needed    Contact information:    [address] [place] [address] [phone]          Discharge Instructions       The x-rays are normal.    Continue to use ibuprofen or Aleve if needed for pain and inflammation.  This should heal well with time.    Follow-up in clinic if not improving over the next couple of weeks.    I hope that you have a wonderful Mansi!!    Discharge References/Attachments     BONE BRUISE (BONE CONTUSION), UNDERSTANDING (ENGLISH)      24 Hour Appointment Hotline       To make an appointment at any [facility], call [phone] ([phone]). If you don't have a family doctor or clinic, we will help you find one. [facility] clinics are conveniently located to serve the needs of you and your family.             Review of your medicines      Our records show that you are taking the medicines listed below. If these are incorrect, please call your family doctor or clinic.        Dose / Directions Last dose taken    IBUPROFEN PO   Dose:  800 mg   Indication:  Mild to Moderate Pain        Take 800 mg by mouth every 4 hours as needed   Refills:  0        lidocaine VISCOUS 2 % solution   Commonly known as:  XYLOCAINE   Quantity:  100 mL        5 to 15 ml, At back of throat slowly swish and swallow or spit every 3-8 hours as needed; max 8 doses/24 hour period   Refills:   0        paragard intrauterine copper device   Dose:  1 each   Quantity:  1 Device        1 each by Intrauterine route once.   Refills:  0                Procedures and tests performed during your visit     XR Hand Left G/E 3 Views      Orders Needing Specimen Collection     None      Pending Results     No orders found from 11/28/2018 to 12/1/2018.            Pending Culture Results     No orders found from 11/28/2018 to 12/1/2018.            Pending Results Instructions     If you had any lab results that were not finalized at the time of your Discharge, you can call the ED Lab Result RN at [phone]. You will be contacted by this team for any positive Lab results or changes in treatment. The nurses are available 7 days a week from 10A to 6:30P.  You can leave a message 24 hours per day and they will return your call.        Thank you for choosing [facility]       Thank you for choosing [facility] for your care. Our goal is always to provide you with excellent care. Hearing back from our patients is one way we can continue to improve our services. Please take a few minutes to complete the written survey that you may receive in the mail after you visit with us. Thank you!        [facility] Information     [facility] gives you secure access to your electronic health record. If you see a primary care provider, you can also send messages to your care team and make appointments. If you have questions, please call your primary care clinic.  If you do not have a primary care provider, please call [phone] and they will assist you.        Care EveryWhere ID     This is your Care EveryWhere ID. This could be used by other organizations to access your [facility] medical records  [id number]        Equal Access to Services     NENA WILCOX : Hadii antwon rauscho Sodaisy, waaxda luqadaha, qaybta kaalmasolitario storm, bhupendra matos . So [facility] [phone].    ATENCIÓN: madonna Chavez  disposición servicios gratuitos de asistencia lingüística. Jones al [phone].    We comply with applicable federal civil rights laws and [place] laws. We do not discriminate on the basis of race, color, national origin, age, disability, sex, sexual orientation, or gender identity.            After Visit Summary       This is your record. Keep this with you and show to your community pharmacist(s) and doctor(s) at your next visit.

## 2018-11-30 NOTE — ED PROVIDER NOTES
History     Chief Complaint   Patient presents with     Hand Injury     The history is provided by the patient.     Janeth Argueta is a 33 year old female who presents to the ED with a L hand injury. She states 9 days ago, she had smashed the back of her L hand against a steel palate cardenas at work. She has been working and using this hand since then. Initially swollen and bruised, which has resolved. Remains painful over her knuckles. Has used ibuprofen and ice. Recently been binding 3rd and 4rd digits together. Reports decreased  strength, but is able to move all joints of hand with intact sensation of hand.      Problem List:    Patient Active Problem List    Diagnosis Date Noted     Major depressive disorder, recurrent episode, mild (H) 2015     Priority: Medium     Tobacco abuse 2015     Priority: Medium     Acute gouty arthropathy 2014     Priority: Medium     Anxiety 2012     Priority: Medium     GERD (gastroesophageal reflux disease) 2011     Priority: Medium     CARDIOVASCULAR SCREENING; LDL GOAL LESS THAN 160 10/31/2010     Priority: Medium     Calculus of kidney 2007     Priority: Medium     At 24 weeks. Passed, right sided.           Past Medical History:    Past Medical History:   Diagnosis Date     Calculus of kidney ages 13 and 22     Tobacco abuse 2015       Past Surgical History:    Past Surgical History:   Procedure Laterality Date     C OPEN RX ELBOW DISLOCATION  high school    fracture right elbow, open fixation      SECTION  2011    Procedure: SECTION; Surgeon:LACHO VALENTIN; Location:[facility] OR      TOOTH EXTRACTION W/FORCEP  age 17    wisdom tooth extraction     LITHOTRIPSY  2008    Left lithotripsy. [facility]     ORTHOPEDIC SURGERY         Family History:    Family History   Problem Relation Age of Onset     Hypertension Mother      Musculoskeletal Disorder Mother      Congenital hip disorder      Genitourinary Problems Father      Kidney stones on dad's side     Respiratory Father      COPD - he was a smoker     Diabetes Paternal Grandmother      Cerebrovascular Disease Paternal Grandmother      Cardiovascular Paternal Grandmother      Congenital Anomalies Other      cousin's baby has truncus arteriosus       Social History:  Marital Status:   [2]  Social History   Substance Use Topics     Smoking status: Current Every Day Smoker     Packs/day: 0.50     Years: 5.00     Types: Cigarettes     Last attempt to quit: 1/1/2010     Smokeless tobacco: Never Used     Alcohol use No        Medications:      IBUPROFEN PO   IUD's (PARAGARD INTRAUTERINE COPPER)   lidocaine, viscous, (XYLOCAINE) 2 % solution         Review of Systems   All other ROS reviewed and are negative or non-contributory except as stated in HPI.     Physical Exam   BP: 114/83  Pulse: 66  Temp: 98.2  F (36.8  C)  Resp: 14  Weight: 81.6 kg (180 lb)  SpO2: 98 %    Physical Exam   Constitutional: She is oriented to person, place, and time. No distress.   HENT:   Head: Normocephalic and atraumatic.   Eyes: Conjunctivae are normal. No scleral icterus.   Neck: Normal range of motion.   Cardiovascular: Normal rate, regular rhythm and intact distal pulses.    No murmur heard.  Pulmonary/Chest: Effort normal and breath sounds normal. No respiratory distress. She has no wheezes. She has no rales.   Musculoskeletal:   ROM of left hand intact. Tenderness over L 3rd and 4th metacarpals. No tenderness over fingers or carpal bones. Normal ROM of wrist. Minimal swelling present over metacarpals. Sensation of L hand intact. Pain with .   Neurological: She is alert and oriented to person, place, and time.   Skin: Skin is warm and dry. She is not diaphoretic.   No bruising of hand.       ED Course     ED Course     Procedures    Results for orders placed or performed during the hospital encounter of 11/30/18 (from the past 24 hour(s))   XR Hand Left G/E 3  Views    Narrative    LEFT HAND THREE OR MORE VIEWS  11/30/2018 10:07 AM     HISTORY:  Pain, trauma.     COMPARISON: None.      Impression    IMPRESSION: Three views left hand. No fracture or dislocation. No  significant soft tissue swelling.    PEDRO CONNELLY MD       Medications - No data to display    Assessments & Plan (with Medical Decision Making)  Janeth is a 32 yo female who presents to the ED after a L hand injury. Nine days ago, she had smashed the back of her hand into a steel palate cardenas. Had initially been bruised and swollen. Swelling and bruising has resolved, but still having pain over the metacarpal region. On arrival, vitals were WNLs. Exam revealed slight swelling, full ROM and intact sensation, and tenderness over 3rd and 4th metacarpal. Xray of L hand was obtained, revealing no fracture or dislocation and no significant soft tissue swelling. Likely a contusion of the L hand that has not resolved. Discussed with patient that this should heal well over time. Should follow up in clinic if this is not improving and continue ice and ibuprofen. Patient was agreeable with this plan of care and discharged in stable condition.     I have reviewed the nursing notes.    I have reviewed the findings, diagnosis, plan and need for follow up with the patient.  Discharge Medication List as of 11/30/2018 10:43 AM          Final diagnoses:   Contusion of left hand, initial encounter     Disposition: Patient discharged home in stable condition.  Plan as above.  Return for concerns.      Patient was seen and examined by myself and Dr. Akers. The note was then scribed by me.     Priti Romero, MS3  November 30, 2018 11/30/2018   [facility] EMERGENCY DEPARTMENT     Kesha Akers MD  11/30/18 4209

## 2018-11-30 NOTE — DISCHARGE INSTRUCTIONS
The x-rays are normal.    Continue to use ibuprofen or Aleve if needed for pain and inflammation.  This should heal well with time.    Follow-up in clinic if not improving over the next couple of weeks.    I hope that you have a wonderful Mansi!!

## 2018-11-30 NOTE — ED AVS SNAPSHOT
[facility]    [address] [place] [address]    Phone:  [phone]    Fax:  [phone]                                       Janeth Argueta   MRN: [id number]    Department:  [facility]   Date of Visit:  11/30/2018           After Visit Summary Signature Page     I have received my discharge instructions, and my questions have been answered. I have discussed any challenges I see with this plan with the nurse or doctor.    ..........................................................................................................................................  Patient/Patient Representative Signature      ..........................................................................................................................................  Patient Representative Print Name and Relationship to Patient    ..................................................               ................................................  Date                                   Time    ..........................................................................................................................................  Reviewed by Signature/Title    ...................................................              ..............................................  Date                                               Time          22EPIC Rev 08/18

## 2019-11-07 ENCOUNTER — HEALTH MAINTENANCE LETTER (OUTPATIENT)
Age: 34
End: 2019-11-07

## 2020-02-10 ENCOUNTER — OFFICE VISIT (OUTPATIENT)
Dept: URGENT CARE | Facility: RETAIL CLINIC | Age: 35
End: 2020-02-10
Payer: COMMERCIAL

## 2020-02-10 VITALS
SYSTOLIC BLOOD PRESSURE: 121 MMHG | DIASTOLIC BLOOD PRESSURE: 77 MMHG | OXYGEN SATURATION: 99 % | TEMPERATURE: 98.6 F | HEART RATE: 66 BPM

## 2020-02-10 DIAGNOSIS — B34.9 VIRAL SYNDROME: Primary | ICD-10-CM

## 2020-02-10 DIAGNOSIS — J20.9 ACUTE BRONCHITIS WITH SYMPTOMS > 10 DAYS: ICD-10-CM

## 2020-02-10 LAB
FLUAV AG UPPER RESP QL IA.RAPID: NORMAL
FLUBV AG UPPER RESP QL IA.RAPID: NORMAL

## 2020-02-10 PROCEDURE — 87804 INFLUENZA ASSAY W/OPTIC: CPT | Mod: QW | Performed by: INTERNAL MEDICINE

## 2020-02-10 PROCEDURE — 99213 OFFICE O/P EST LOW 20 MIN: CPT | Performed by: INTERNAL MEDICINE

## 2020-02-10 RX ORDER — AZITHROMYCIN 250 MG/1
TABLET, FILM COATED ORAL
Qty: 6 TABLET | Refills: 0 | Status: SHIPPED | OUTPATIENT
Start: 2020-02-10 | End: 2020-06-11

## 2020-02-10 NOTE — PROGRESS NOTES
[facility] Care Progress Note        Brodie Avilez MD, MPH  02/10/2020        History:      Janeth Argueta is a pleasant 34 year old year old female with a chief complaint of nasal congestion and a non-productive cough since a week and a half ago.   Low grade fever w/o chills.  No dyspnea or wheezing.   No smoking history.   No headache or neck pain.  No GI or  symptoms.   No MSK symptoms.         Assessment and Plan:        - INFLUENZA A/B ANTIGEN: Negative.     Acute bronchitis with symptoms > 10 days  - azithromycin (ZITHROMAX) 250 MG tablet; Two tablets first day, then one tablet daily for four days.  Dispense: 6 tablet; Refill: 0.  Wash hands w soap and water frequently.  Discussed supportive care with the patient/family  Advised to increase fluid intake and rest the next 2 days.  Tylenol 650 mg PO for pain/fever q 6 hours as needed.  F/u w PCP in 4-5 days, earlier if symptoms worsen.                   Physical Exam:      /77 (BP Location: Right arm, Patient Position: Sitting, Cuff Size: Adult Large)   Pulse 66   Temp 98.6  F (37  C) (Oral)   SpO2 99%      Constitutional: Patient is in no distress The patient is pleasant and cooperative.   HEENT: Head:  Head is atraumatic, normocephalic.    Eyes: Pupils are equal, round and reactive to light and accomodation.  Sclera is non-icteric. No conjunctival injection, or exudate noted. Extraocular motion is intact. Visual acuity is intact bilaterally.  Ears:  External acoustic canals are patent and clear.  There is no erythema and bulging( exudate)  of the ( Right/Left ) tympanic membrane(s ).   Nose:  Nasal congestion w/o drainage or mucosal ulceration is noted.  Throat:  Oral mucosa is moist.  No oral lesions are noted. Posterior pharynx is clear.     Neck Supple.  There is no cervical lymphadenopathy.  No nuchal rigidity noted.  There is no meningismus.     Cardiovascular: Heart is regular to rate and rhythm.  No murmur is noted.      Lungs: Clear in the anterior and posterior pulmonary fields.   Abdomen: Soft and non-tender.    Back No flank tenderness is noted.   Extremeties No edema, no calf tenderness.   Neuro: No focal deficit.   Skin No petechiae or purpura is noted.  There is no rash.   Mood Normal              Data:      All new lab and imaging data was reviewed.   Results for orders placed or performed in visit on 02/10/20   INFLUENZA A/B ANTIGEN     Status: Normal   Result Value Ref Range    Influenza A Neg neg    Influenza B Neg neg

## 2020-05-08 ENCOUNTER — VIRTUAL VISIT (OUTPATIENT)
Dept: FAMILY MEDICINE | Facility: OTHER | Age: 35
End: 2020-05-08

## 2020-05-09 NOTE — PROGRESS NOTES
"Date: 2020 20:10:30  Clinician: Elaine Yanes  Clinician NPI: [id number]  Patient: Janeth Freeman  Patient : 1985  Patient Address: [address] [place] [address]  Patient Phone: [phone]  Visit Protocol: URI  Patient Summary:  Janeth is a 35 year old ( : 1985 ) female who initiated a Visit for COVID-19 (Coronavirus) evaluation and screening. When asked the question \"Please sign me up to receive news, health information and promotions from [employer].\", Janeth responded \"No\".    Janeth states her symptoms started gradually 3-4 days ago.   Her symptoms consist of a cough, malaise, and wheezing. She is experiencing mild difficulty breathing with activities but can speak normally in full sentences.   Symptom details     Cough: Janeth coughs every 5-10 minutes and her cough is more bothersome at night. Phlegm comes into her throat when she coughs. She does not believe her cough is caused by post-nasal drip. The color of the phlegm is yellow.     Wheezing: Janeth has not ever been diagnosed with asthma. The wheezing interferes with her normal daily activities.     Janeth denies having fever, myalgias, rhinitis, facial pain or pressure, sore throat, nasal congestion, vomiting, nausea, teeth pain, ageusia, anosmia, diarrhea, ear pain, headache, enlarged lymph nodes, and chills. She also denies taking antibiotic medication for the symptoms, double sickening (worsening symptoms after initial improvement), and having recent facial or sinus surgery in the past 60 days.   Precipitating events  She has not recently been exposed to someone with influenza. Janeth has not been in close contact with any high risk individuals.   Pertinent COVID-19 (Coronavirus) information  Janeth does not work or volunteer as healthcare worker or a  and does not work or volunteer in a healthcare facility.   She does not live with a healthcare worker.   Janeth has had a close contact with a laboratory-confirmed COVID-19 " patient within 14 days of symptom onset. She has not had a close contact with a suspected COVID-19 patient within 14 days of symptom onset. Additional information about contact with COVID-19 (Coronavirus) patient as reported by the patient (free text): I work with an individual who tested positive. She has not been in the facility for 14 days. I apparently was not in direct contact but working in the same area.   Triage Point(s) temporarily suspended for COVID-19 (Coronavirus) screening  Janeth reported the following symptoms which were previously protocol referral points. These protocol referral points have temporarily been removed for purposes of COVID-19 (Coronavirus) screening.   Wheezing that keeps Janeth from doing daily activities   Pertinent medical history  Janeth does not get yeast infections when she takes antibiotics.   Janeth does not need a return to work/school note.   Weight: 180 lbs   Janeth does not smoke or use smokeless tobacco.   She denies pregnancy and denies breastfeeding. She has menstruated in the past month.   Weight: 180 lbs  A synchronous phone visit was initiated by the provider for the following reason: Clarify wheezing    MEDICATIONS: No current medications, ALLERGIES: NKDA  Clinician Response:  Dear Janeth,   Dear Janeth  Your symptoms show that you may have coronavirus (COVID-19). This illness can cause fever, cough and trouble breathing. Many people get a mild case and get better on their own. Some people can get very sick.   Will I be tested for COVID-19?  Because we have limited testing supplies, we do not test everyone who is at low risk. We are testing if:    You are very ill. For example, you're on chemotherapy, dialysis or home hospice care. (Contact your specialty clinic or program.)   You live in a nursing home or other long-term care facility. (Talk to your nurse manager or medical director.)   You're a health care worker. ([facility] employees contact our employee health  office for testing.)   We are performing limited curbside testing for healthcare/first responders and people with medical problems that put them at increased risk. It does not appear by the [facility] information you submitted that you meet any of these criteria. If there are medical problems that we did not know about, please repeat an [facility] visit and let us know what medical conditions you have.   How can I protect others?  Without a test, we can't know for sure that you have COVID-19. For safety, it's very important to follow these rules.  First, stay home and away from others (self-isolate) until:   You've had no fever---and no medicine that reduces fever---for 3 full days (72 hours). And...    Your other symptoms have gotten better. For example, your cough or breathing has improved. And...   At least 10 days have passed since your symptoms started.   During this time:   Don't go to work, school or anywhere else.    Stay away from others in your home. No hugging, kissing or shaking hands.   Don't let anyone visit.   Cover your mouth and nose with a mask, tissue or wash cloth to avoid spreading germs.   Wash your hands and face often. Use soap and water.   How can I take care of myself?   1.Take Tylenol (acetaminophen) for fever or pain. If you have liver or kidney problems, ask your family doctor if it's okay to take Tylenol.        Adults can take either:    650 mg (two 325 mg pills) every 4 to 6 hours, or...   1,000 mg (two 500 mg pills) every 8 hours as needed.    Note: Don't take more than 3,000 mg in one day.   For children, check the Tylenol bottle for the right dose. The dose is based on the child's age or weight.   2.If you have other health problems (like cancer, heart failure, an organ transplant or severe kidney disease): Call your specialty clinic if you don't feel better in the next 2 days.       3.Know when to call 911: If your breathing is so bad that it keeps you from doing normal activities, call  911 or go to the emergency room. Tell them that you've been staying home and may have COVID-19.   Where can I get more information?  To learn more about COVID-19 and how to care for yourself at home, please visit the CDC website at https://www.cdc.gov/coronavirus/2019-ncov/about/steps-when-sick.html.  For more about your care at [facility], please visit [email or web address]   If you are interested in becoming part of a [facility] clinic trial related to COVID19 please go to [email or web address] for information, if you qualify.     Diagnosis: Cough  Diagnosis ICD: R05  Triage Notes: I reviewed the patient's history, verified their identity, and explained the Visit process.    Spoke with patient regarding wheezing. Not sob on phone, speaks in full sentences.   Janeth states her symptoms started gradually 1 days ago.  Her symptoms consist of a cough, malaise, and wheezing. She is experiencing mild difficulty breathing with activities but can speak normally in full sentences. Minor chills, no fever. She is feeling better  She works at [employer] , works with someone whose tested positive 2 days ago not in direct contact with that person. Does not qualify for testing (Not high risk not healthcare worker)  Synchronous Triage: phone, status: completed, duration: 386 seconds

## 2020-05-10 ENCOUNTER — VIRTUAL VISIT (OUTPATIENT)
Dept: FAMILY MEDICINE | Facility: OTHER | Age: 35
End: 2020-05-10

## 2020-05-10 NOTE — PROGRESS NOTES
"Date: 05/10/2020 13:37:26  Clinician: Kyree Mcclellan  Clinician NPI: [id number]  Patient: Janeth Freeman  Patient : 1985  Patient Address: [address] [place] [address]  Patient Phone: [phone]  Visit Protocol: URI  Patient Summary:  Janeth is a 35 year old ( : 1985 ) female who initiated a Visit for COVID-19 (Coronavirus) evaluation and screening. When asked the question \"Please sign me up to receive news, health information and promotions from [employer].\", Janeth responded \"No\".    Janeth states her symptoms started gradually 5-6 days ago.   Her symptoms consist of myalgia, a cough, malaise, wheezing, and chills.   Symptom details     Cough: Janeth coughs almost every minute and her cough is more bothersome at night. Phlegm comes into her throat when she coughs. She does not believe her cough is caused by post-nasal drip. The color of the phlegm is white.     Wheezing: Janeth has not ever been diagnosed with asthma. The wheezing interferes with her normal daily activities.     Janeth denies having fever, rhinitis, facial pain or pressure, sore throat, nasal congestion, vomiting, nausea, teeth pain, ageusia, anosmia, diarrhea, ear pain, headache, and enlarged lymph nodes. She also denies taking antibiotic medication for the symptoms, double sickening (worsening symptoms after initial improvement), and having recent facial or sinus surgery in the past 60 days.   Precipitating events  She has not recently been exposed to someone with influenza. Janeth has not been in close contact with any high risk individuals.   Pertinent COVID-19 (Coronavirus) information  Janeth does not work or volunteer as healthcare worker or a  and does not work or volunteer in a healthcare facility.   She does not live with a healthcare worker.   Janeth has not had a close contact with a laboratory-confirmed COVID-19 patient within 14 days of symptom onset. She also has not had a close contact with a suspected " COVID-19 patient within 14 days of symptom onset.   Triage Point(s) temporarily suspended for COVID-19 (Coronavirus) screening  Janeth reported the following symptoms which were previously protocol referral points. These protocol referral points have temporarily been removed for purposes of COVID-19 (Coronavirus) screening.     Difficulty breathing even when resting and can only speak in phrase(s)    Wheezing that keeps Janeth from doing daily activities     Pertinent medical history  Janeth does not get yeast infections when she takes antibiotics.   Janeth does not need a return to work/school note.   Weight: 180 lbs   Janeth does not smoke or use smokeless tobacco.   She denies pregnancy and denies breastfeeding. She has menstruated in the past month.   Weight: 180 lbs    MEDICATIONS: No current medications, ALLERGIES: NKDA  Clinician Response:  Dear Janeth,    Dear Janeth  Your symptoms show that you may have coronavirus (COVID-19). This illness can cause fever, cough and trouble breathing. Many people get a mild case and get better on their own. Some people can get very sick.  Will I be tested for COVID-19?  Because we have limited testing supplies we are not testing everyone if they are low risk. We are testing if:   You are very ill. For example, you're on chemotherapy, dialysis or home hospice care. (Contact your specialty clinic or program.)   You live in a nursing home or other long-term care facility. (Talk to your nurse manager or medical director.)   You're a health care worker. ([facility] employees Contact our employee health office for testing.)   We are performing limited curbside testing for healthcare/first responders and people with medical problems that put them at increased risk. It does not appear by the [facility] information you submitted that you meet any of these criteria. If there are medical problems that we did not know about, please repeat an [facility] visit and let us know what medical  conditions you have.     While you do not qualify for testing here at [facility] there are testing sites throughout [place] that are testing patients that are symptomatic with exposure. I have attached the link to look for potential sites that would test you for COVID-19. https://[place].gov/covid19/for[place]/if-sick/testing-locations/      How can I protect others?  Without a test, we can't know for sure that you have COVID-19. For safety, it's very important to follow these rules.  First, stay home and away from others (self-isolate) until:   You've had no fever---and no medicine that reduces fever---for 3 full days (72 hours). And...    Your other symptoms have gotten better. For example, your cough or breathing has improved. And...   At least 10 days have passed since your symptoms started.   During this time:   Don't go to work, school or anywhere else.    Stay away from others in your home. No hugging, kissing or shaking hands.   Don't let anyone visit.   Cover your mouth and nose with a mask, tissue or wash cloth to avoid spreading germs.   Wash your hands and face often. Use soap and water.   How can I take care of myself?  1.Take Tylenol (acetaminophen) for fever or pain. If you have liver or kidney problems, ask your family doctor if it's okay to take Tylenol.   Adults can take either:    650 mg (two 325 mg pills) every 4 to 6 hours, or...   1,000 mg (two 500 mg pills) every 8 hours as needed.    Note: Don't take more than 3,000 mg in one day.  For children, check the Tylenol bottle for the right dose. The dose is based on the child's age or weight.    Additional Symptomatic Treatment Recommendations:   Push fluids  Get Plenty of rest  Tylenol to help with pain or fever  Salt water gargles or lozenges to help with sore throat  Humidified air can help with congestion.   Over the counter nasal spray such as Fluticasone or Afrin nasal spray to help with sinus congestion  Over the counter cough/cold  medication such as Delsym, Dayquil, Nyquil, Mucinex DM, or store brand equivalents as needed.&nbsp;  Over the counter decongestant such as Sudafed as needed (if have high blood pressure ask pharmacist about decongestants that are safe with high blood pressure)  Can do a cool compress to the forehead or back of neck to help with headache      2.If you have other health problems (like cancer, heart failure, an organ transplant or severe kidney disease): Call your specialty clinic if you don't feel better in the next 2 days.  3.Know when to call 911: If your breathing is so bad that it keeps you from doing normal activities, call 911 or go to the emergency room. Tell them that you've been staying home and may have COVID-19.  4.Sign up for [email or web address]. We know it's scary to hear that you might have COVID-19. We want to track your symptoms to make sure you're okay over the next 2 weeks. Please look for an email from [email or web address]---this is a free, online program that we'll use to keep in touch. To sign up, follow the link in the email. Learn more at [email or web address].  Where can I get more information?  To learn more about COVID-19 and how to care for yourself at home, please visit the CDC website at https://www.cdc.gov/coronavirus/2019-ncov/about/steps-when-sick.html.  For more options for care at [facility], please visit our website at [email or web address]   If you are interested in becoming part of a [facility] clinic trial related to COVID19 please go to [email or web address] for information, if you qualify.         Diagnosis: Cough  Diagnosis ICD: R05  Prescription: albuterol sulfate (Ventolin HFA) 90 mcg/actuation inhalation HFA aerosol inhaler 1 60 inhalation canister (ventolin hfa or equivalent), 0 days supply. Inhale 2 puffs every 4 hours as needed for cough, wheeze, or shortness of breath. Refills: 0, Refill as needed: no, Allow substitutions:  yes  Pharmacy: [facility] - [phone] - [address], [place] [address]

## 2020-06-11 ENCOUNTER — HOSPITAL ENCOUNTER (OUTPATIENT)
Dept: GENERAL RADIOLOGY | Facility: CLINIC | Age: 35
End: 2020-06-11
Attending: FAMILY MEDICINE
Payer: COMMERCIAL

## 2020-06-11 ENCOUNTER — OFFICE VISIT (OUTPATIENT)
Dept: FAMILY MEDICINE | Facility: CLINIC | Age: 35
End: 2020-06-11
Payer: COMMERCIAL

## 2020-06-11 VITALS
OXYGEN SATURATION: 100 % | TEMPERATURE: 96.4 F | WEIGHT: 203.8 LBS | HEART RATE: 85 BPM | BODY MASS INDEX: 36.1 KG/M2 | DIASTOLIC BLOOD PRESSURE: 72 MMHG | SYSTOLIC BLOOD PRESSURE: 110 MMHG

## 2020-06-11 DIAGNOSIS — M53.3 COCCYDYNIA: Primary | ICD-10-CM
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
## 2020-06-11 PROCEDURE — 72220 X-RAY EXAM SACRUM TAILBONE: CPT | Mod: TC

## 2020-06-11 PROCEDURE — 72170 X-RAY EXAM OF PELVIS: CPT | Mod: TC

## 2020-06-11 PROCEDURE — 99213 OFFICE O/P EST LOW 20 MIN: CPT | Performed by: FAMILY MEDICINE

## 2020-06-11 NOTE — PROGRESS NOTES
Subjective     Janeth Argueta is a 35 year old female who presents to clinic today for the following health issues:    HPI   Musculoskeletal Pain    Onset: 4 days    Description:   Location: ribs and tailbone  Character: Sharp, Dull ache, Stabbing and tingling    Intensity: moderate, severe    Progression of Symptoms: worse    Accompanying Signs & Symptoms:  Other symptoms: bruising around ribs    History:   Previous similar pain: no       Precipitating factors:   Trauma or overuse: YES- bucked off horse    Alleviating factors:  Improved by: nothing    Therapies Tried and outcome:       Patient Active Problem List   Diagnosis     Calculus of kidney     CARDIOVASCULAR SCREENING; LDL GOAL LESS THAN 160     GERD (gastroesophageal reflux disease)     Anxiety     Acute gouty arthropathy     Tobacco abuse     Major depressive disorder, recurrent episode, mild (H)     Past Surgical History:   Procedure Laterality Date     C OPEN RX ELBOW DISLOCATION  high school    fracture right elbow, open fixation      SECTION  2011    Procedure: SECTION; Surgeon:LACHO VALENTIN; Location:[facility] OR     HC TOOTH EXTRACTION W/FORCEP  age 17    wisdom tooth extraction     LITHOTRIPSY  2008    Left lithotripsy. [facility].     ORTHOPEDIC SURGERY         Social History     Tobacco Use     Smoking status: Former Smoker     Packs/day: 0.50     Years: 5.00     Pack years: 2.50     Types: Cigarettes     Last attempt to quit: 2020     Years since quittin.4     Smokeless tobacco: Never Used   Substance Use Topics     Alcohol use: No     Family History   Problem Relation Age of Onset     Hypertension Mother      Musculoskeletal Disorder Mother         Congenital hip disorder     Genitourinary Problems Father         Kidney stones on dad's side     Respiratory Father         COPD - he was a smoker     Diabetes Paternal Grandmother      Cerebrovascular Disease Paternal Grandmother      Cardiovascular  Paternal Grandmother      Congenital Anomalies Other         cousin's baby has truncus arteriosus           Reviewed and updated as needed this visit by Provider  Tobacco  Allergies  Meds  Problems  Med Hx  Surg Hx  Fam Hx         Review of Systems   Constitutional, HEENT, cardiovascular, pulmonary, gi and gu systems are negative, except as otherwise noted.      Objective    /72   Pulse 85   Temp 96.4  F (35.8  C) (Temporal)   Wt 92.4 kg (203 lb 12.8 oz)   SpO2 100%   BMI 36.10 kg/m    Body mass index is 36.1 kg/m .  Physical Exam   Vitals noted.  Patient alert, oriented, and in mild distress. Standing and walking in the room with a slight limp, can't sit comfortably.   She has some bruising over the left rib cage laterally and posteriorly, resolving. No hematoma, no deformity.   No bruising over the lower back/hips. She is tender with palpation over the coccyx.  No pain with compression of the pelvis or palpation of the ryan bilaterally, minimally tender with palpation of the pubis.    Diagnostic Test Results:  Xray -x-ray of the pelvis and sacrum independently reviewed by me and show no obvious fractures.        Assessment & Plan       ICD-10-CM    1. Coccydynia  M53.3 XR Sacrum and Coccyx 2 Views     XR Pelvis 1/2 Views   2. Fall, initial encounter  W19.XXXA XR Sacrum and Coccyx 2 Views     XR Pelvis 1/2 Views          Reassured Janeth that I see no fractures.  We discussed that coccygeal fractures can be hard to identify but also no specific treatment is available other than comfort cares.  We discussed using ice for a few more days before switching to heat.  We discussed doughnut or U-shaped pillows to avoid pressure on the coccyx.  We discussed positions for sitting and sleeping to avoid pressure.  Discussed some gentle stretches for the low back and buttocks to relieve pressure on the tailbone.  I recommend ibuprofen 600 mg 3 times daily as needed and offered her pain medication and she  declines.  We will follow-up if not improving over the next few weeks or sooner if any worsening.    No follow-ups on file.    Patsy Mckee MD  [facility]

## 2020-11-13 ENCOUNTER — HOSPITAL ENCOUNTER (EMERGENCY)
Facility: CLINIC | Age: 35
Discharge: HOME | End: 2020-11-14
Attending: NURSE PRACTITIONER | Admitting: NURSE PRACTITIONER
Payer: COMMERCIAL

## 2020-11-13 DIAGNOSIS — K04.7 DENTAL INFECTION: ICD-10-CM

## 2020-11-13 PROCEDURE — 99283 EMERGENCY DEPT VISIT LOW MDM: CPT | Performed by: NURSE PRACTITIONER

## 2020-11-13 PROCEDURE — 99284 EMERGENCY DEPT VISIT MOD MDM: CPT | Performed by: NURSE PRACTITIONER

## 2020-11-13 NOTE — ED AVS SNAPSHOT
[facility] Emergency Dept  [address] [place] [address]  Phone: [phone]  Fax: [phone]                                    Janeth Argueta   MRN: [id number]    Department: [facility] Emergency Dept   Date of Visit: 11/13/2020           After Visit Summary Signature Page    I have received my discharge instructions, and my questions have been answered. I have discussed any challenges I see with this plan with the nurse or doctor.    ..........................................................................................................................................  Patient/Patient Representative Signature      ..........................................................................................................................................  Patient Representative Print Name and Relationship to Patient    ..................................................               ................................................  Date                                   Time    ..........................................................................................................................................  Reviewed by Signature/Title    ...................................................              ..............................................  Date                                               Time          22EPIC Rev 08/18

## 2020-11-14 VITALS
RESPIRATION RATE: 16 BRPM | TEMPERATURE: 98.3 F | DIASTOLIC BLOOD PRESSURE: 64 MMHG | OXYGEN SATURATION: 99 % | WEIGHT: 208.8 LBS | HEART RATE: 55 BPM | SYSTOLIC BLOOD PRESSURE: 127 MMHG | BODY MASS INDEX: 36.99 KG/M2

## 2020-11-14 PROCEDURE — 250N000013 HC RX MED GY IP 250 OP 250 PS 637: Performed by: NURSE PRACTITIONER

## 2020-11-14 RX ORDER — HYDROCODONE BITARTRATE AND ACETAMINOPHEN 5; 325 MG/1; MG/1
2 TABLET ORAL ONCE
Status: COMPLETED | OUTPATIENT
Start: 2020-11-14 | End: 2020-11-14

## 2020-11-14 RX ORDER — HYDROCODONE BITARTRATE AND ACETAMINOPHEN 5; 325 MG/1; MG/1
1 TABLET ORAL EVERY 6 HOURS PRN
Qty: 12 TABLET | Refills: 0 | Status: SHIPPED | OUTPATIENT
Start: 2020-11-14 | End: 2020-11-17

## 2020-11-14 RX ORDER — PENICILLIN V POTASSIUM 500 MG/1
500 TABLET, FILM COATED ORAL ONCE
Status: COMPLETED | OUTPATIENT
Start: 2020-11-14 | End: 2020-11-14

## 2020-11-14 RX ORDER — PENICILLIN V POTASSIUM 500 MG/1
500 TABLET, FILM COATED ORAL 4 TIMES DAILY
Qty: 40 TABLET | Refills: 0 | Status: SHIPPED | OUTPATIENT
Start: 2020-11-14 | End: 2020-11-24

## 2020-11-14 RX ADMIN — HYDROCODONE BITARTRATE AND ACETAMINOPHEN 2 TABLET: 5; 325 TABLET ORAL at 00:22

## 2020-11-14 RX ADMIN — PENICILLIN V POTASSIUM 500 MG: 500 TABLET, FILM COATED ORAL at 00:22

## 2020-11-14 ASSESSMENT — ENCOUNTER SYMPTOMS
APPETITE CHANGE: 0
RESPIRATORY NEGATIVE: 1
NEUROLOGICAL NEGATIVE: 1
FEVER: 0
CARDIOVASCULAR NEGATIVE: 1
SORE THROAT: 0
GASTROINTESTINAL NEGATIVE: 1

## 2020-11-14 NOTE — DISCHARGE INSTRUCTIONS
Penicillin 500 mg four times a day for 10 days.  Ibuprofen 400-600 mg every 6-8 hours as needed for mild/moderate pain. Take with food. Stop if it is causing nausea or abdominal pain.   Tylenol 650 mg every 4 hours as needed for mild/moderate pain.  OR--  Norco 5-325 (hydrocodone-acetaminophen) 1 tablet every 6 hours if needed for moderate/severe pain. Do not use alcohol, operate machinery, drive, or climb on ladders for 8 hours after taking Norco.   Use docusate (100mg) 2 times a day to prevent constipation while taking opioid medication.     Return to the emergency department for fevers, increased facial swelling, vomiting, or worse in any way.

## 2020-11-14 NOTE — ED PROVIDER NOTES
History     Chief Complaint   Patient presents with     Dental Pain     HPI  Janeth Argueta is a 35 year old female who presents to the emergency department for evaluation of dental pain.  Pain located in the left lower jaw.  Symptoms started 1 week ago.  Patient tells me she has very bad teeth and has not been able to get into a dentist since prior to the Covid pandemic.  Patient states she has ongoing intermittent pain, but pain worsened in the last week and today has some swelling to her left jaw.  Denies fever or chills.  Denies nausea or vomiting.    Allergies:  Allergies   Allergen Reactions     No Known Allergies        Problem List:    Patient Active Problem List    Diagnosis Date Noted     Major depressive disorder, recurrent episode, mild (H) 2015     Priority: Medium     Tobacco abuse 2015     Priority: Medium     Acute gouty arthropathy 2014     Priority: Medium     Anxiety 2012     Priority: Medium     GERD (gastroesophageal reflux disease) 2011     Priority: Medium     CARDIOVASCULAR SCREENING; LDL GOAL LESS THAN 160 10/31/2010     Priority: Medium     Calculus of kidney 2007     Priority: Medium     At 24 weeks. Passed, right sided.           Past Medical History:    Past Medical History:   Diagnosis Date     Calculus of kidney ages 13 and 22     Tobacco abuse 2015       Past Surgical History:    Past Surgical History:   Procedure Laterality Date     C OPEN RX ELBOW DISLOCATION  high school    fracture right elbow, open fixation      SECTION  2011    Procedure: SECTION; Surgeon:LACHO VALENTIN; Location: OR      TOOTH EXTRACTION W/FORCEP  age 17    wisdom tooth extraction     LITHOTRIPSY  2008    Left lithotripsy. [facility]     ORTHOPEDIC SURGERY         Family History:    Family History   Problem Relation Age of Onset     Hypertension Mother      Musculoskeletal Disorder Mother         Congenital hip disorder      Genitourinary Problems Father         Kidney stones on dad's side     Respiratory Father         COPD - he was a smoker     Diabetes Paternal Grandmother      Cerebrovascular Disease Paternal Grandmother      Cardiovascular Paternal Grandmother      Congenital Anomalies Other         cousin's baby has truncus arteriosus       Social History:  Marital Status:   [2]  Social History     Tobacco Use     Smoking status: Former Smoker     Packs/day: 0.50     Years: 5.00     Pack years: 2.50     Types: Cigarettes     Quit date: 2020     Years since quittin.8     Smokeless tobacco: Never Used   Substance Use Topics     Alcohol use: No     Drug use: No     Comment: THC in the past        Medications:         HYDROcodone-acetaminophen (NORCO) 5-325 MG tablet       penicillin V (VEETID) 500 MG tablet       IBUPROFEN PO       IUD's (PARAGARD INTRAUTERINE COPPER)       lidocaine, viscous, (XYLOCAINE) 2 % solution          Review of Systems   Constitutional: Negative for appetite change and fever.   HENT: Positive for dental problem. Negative for ear pain and sore throat.    Respiratory: Negative.    Cardiovascular: Negative.    Gastrointestinal: Negative.    Neurological: Negative.    All other systems reviewed and are negative.      Physical Exam   BP: 94/44  Pulse: 55  Temp: 98.3  F (36.8  C)  Resp: 16  Weight: 94.7 kg (208 lb 12.8 oz)  SpO2: 99 %      Physical Exam  General: healthy, alert and moderate distress  ENT: ENT exam normal, no neck nodes or sinus tenderness. No trismus.  Mouth: facial swelling is Present   tenderness to touch at tooth: #19   Teeth carious:yes and severe    Teeth broken: yes and severe   Visible abscess: no         ED Course        Procedures             No results found for this or any previous visit (from the past 24 hour(s)).    Medications   HYDROcodone-acetaminophen (NORCO) 5-325 MG per tablet 2 tablet (2 tablets Oral Given 20 0022)   penicillin V (VEETID) tablet 500 mg  (500 mg Oral Given 11/14/20 0022)       Assessments & Plan (with Medical Decision Making)   35-year-old with significantly poor dentition who presents to the emergency department for left lower dental pain that started 1 week ago.  No fevers.  No vomiting.  On exam there is obvious left mandibular swelling.  No overlying skin changes or erythema.  Tenderness beneath tooth #19.  Several broken teeth.  No visible/palpable abscess needing I&D.  I offered a dental block but patient declined.  Patient was given Norco 2 tablets p.o. and penicillin 500 mg p.o. here today.  Patient was instructed  as follows:  Penicillin 500 mg four times a day for 10 days.  Ibuprofen 400-600 mg every 6-8 hours as needed for mild/moderate pain. Take with food. Stop if it is causing nausea or abdominal pain.   Tylenol 650 mg every 4 hours as needed for mild/moderate pain.  OR--  Norco 5-325 (hydrocodone-acetaminophen) 1 tablet every 6 hours if needed for moderate/severe pain. Do not use alcohol, operate machinery, drive, or climb on ladders for 8 hours after taking Norco.   Use docusate (100mg) 2 times a day to prevent constipation while taking opioid medication.     Return to the emergency department for fevers, increased facial swelling, vomiting, or worse in any way.      I have reviewed the nursing notes.    I have reviewed the findings, diagnosis, plan and need for follow up with the patient.      Discharge Medication List as of 11/14/2020 12:16 AM      START taking these medications    Details   HYDROcodone-acetaminophen (NORCO) 5-325 MG tablet Take 1 tablet by mouth every 6 hours as needed for severe pain, Disp-12 tablet, R-0, Local Print      penicillin V (VEETID) 500 MG tablet Take 1 tablet (500 mg) by mouth 4 times daily for 10 days, Disp-40 tablet, R-0, Local Print             Final diagnoses:   Dental infection       11/13/2020   [facility] EMERGENCY DEPT     Ki, ROXANA Abel CNP  11/14/20 0127

## 2020-11-29 ENCOUNTER — HEALTH MAINTENANCE LETTER (OUTPATIENT)
Age: 35
End: 2020-11-29

## 2021-09-25 ENCOUNTER — HEALTH MAINTENANCE LETTER (OUTPATIENT)
Age: 36
End: 2021-09-25

## 2022-01-09 ENCOUNTER — HEALTH MAINTENANCE LETTER (OUTPATIENT)
Age: 37
End: 2022-01-09

## 2022-04-20 ASSESSMENT — ANXIETY QUESTIONNAIRES
GAD7 TOTAL SCORE: 0
7. FEELING AFRAID AS IF SOMETHING AWFUL MIGHT HAPPEN: NOT AT ALL
2. NOT BEING ABLE TO STOP OR CONTROL WORRYING: NOT AT ALL
GAD7 TOTAL SCORE: 0
4. TROUBLE RELAXING: NOT AT ALL
3. WORRYING TOO MUCH ABOUT DIFFERENT THINGS: NOT AT ALL
6. BECOMING EASILY ANNOYED OR IRRITABLE: NOT AT ALL
5. BEING SO RESTLESS THAT IT IS HARD TO SIT STILL: NOT AT ALL
GAD7 TOTAL SCORE: 0
1. FEELING NERVOUS, ANXIOUS, OR ON EDGE: NOT AT ALL
7. FEELING AFRAID AS IF SOMETHING AWFUL MIGHT HAPPEN: NOT AT ALL

## 2022-04-20 ASSESSMENT — PATIENT HEALTH QUESTIONNAIRE - PHQ9
SUM OF ALL RESPONSES TO PHQ QUESTIONS 1-9: 1
10. IF YOU CHECKED OFF ANY PROBLEMS, HOW DIFFICULT HAVE THESE PROBLEMS MADE IT FOR YOU TO DO YOUR WORK, TAKE CARE OF THINGS AT HOME, OR GET ALONG WITH OTHER PEOPLE: NOT DIFFICULT AT ALL
SUM OF ALL RESPONSES TO PHQ QUESTIONS 1-9: 1

## 2022-04-21 ASSESSMENT — ANXIETY QUESTIONNAIRES: GAD7 TOTAL SCORE: 0

## 2022-04-21 ASSESSMENT — PATIENT HEALTH QUESTIONNAIRE - PHQ9: SUM OF ALL RESPONSES TO PHQ QUESTIONS 1-9: 1

## 2022-04-22 ENCOUNTER — HOSPITAL ENCOUNTER (OUTPATIENT)
Dept: GENERAL RADIOLOGY | Facility: CLINIC | Age: 37
Discharge: HOME | End: 2022-04-22
Attending: FAMILY MEDICINE | Admitting: FAMILY MEDICINE
Payer: COMMERCIAL

## 2022-04-22 ENCOUNTER — OFFICE VISIT (OUTPATIENT)
Dept: FAMILY MEDICINE | Facility: CLINIC | Age: 37
End: 2022-04-22
Payer: COMMERCIAL

## 2022-04-22 VITALS
DIASTOLIC BLOOD PRESSURE: 70 MMHG | WEIGHT: 215.6 LBS | SYSTOLIC BLOOD PRESSURE: 116 MMHG | TEMPERATURE: 96.9 F | HEIGHT: 65 IN | RESPIRATION RATE: 16 BRPM | BODY MASS INDEX: 35.92 KG/M2 | OXYGEN SATURATION: 98 % | HEART RATE: 95 BPM

## 2022-04-22 DIAGNOSIS — R06.2 WHEEZING WITHOUT DIAGNOSIS OF ASTHMA: ICD-10-CM

## 2022-04-22 DIAGNOSIS — R05.3 CHRONIC COUGHING: ICD-10-CM

## 2022-04-22 DIAGNOSIS — R06.02 SHORTNESS OF BREATH: ICD-10-CM

## 2022-04-22 DIAGNOSIS — R06.02 SHORTNESS OF BREATH: Primary | ICD-10-CM

## 2022-04-22 PROCEDURE — 71046 X-RAY EXAM CHEST 2 VIEWS: CPT

## 2022-04-22 PROCEDURE — 99214 OFFICE O/P EST MOD 30 MIN: CPT | Performed by: FAMILY MEDICINE

## 2022-04-22 RX ORDER — ALBUTEROL SULFATE 90 UG/1
2 AEROSOL, METERED RESPIRATORY (INHALATION) EVERY 6 HOURS
Qty: 18 G | Refills: 0 | Status: SHIPPED | OUTPATIENT
Start: 2022-04-22 | End: 2022-05-21

## 2022-04-22 RX ORDER — INHALER, ASSIST DEVICES
SPACER (EA) MISCELLANEOUS
Qty: 1 EACH | Refills: 3 | Status: SHIPPED | OUTPATIENT
Start: 2022-04-22 | End: 2023-01-17

## 2022-04-22 ASSESSMENT — PAIN SCALES - GENERAL: PAINLEVEL: NO PAIN (0)

## 2022-04-22 ASSESSMENT — ANXIETY QUESTIONNAIRES: GAD7 TOTAL SCORE: 0

## 2022-04-22 ASSESSMENT — ENCOUNTER SYMPTOMS: COUGH: 1

## 2022-04-22 ASSESSMENT — PATIENT HEALTH QUESTIONNAIRE - PHQ9
10. IF YOU CHECKED OFF ANY PROBLEMS, HOW DIFFICULT HAVE THESE PROBLEMS MADE IT FOR YOU TO DO YOUR WORK, TAKE CARE OF THINGS AT HOME, OR GET ALONG WITH OTHER PEOPLE: NOT DIFFICULT AT ALL
SUM OF ALL RESPONSES TO PHQ QUESTIONS 1-9: 1

## 2022-04-22 NOTE — PROGRESS NOTES
"  Assessment & Plan     ASSESSMENT/ORDERS:    ICD-10-CM    1. Shortness of breath  R06.02 XR Chest 2 Views     General PFT Lab (Please always keep checked)     Pulmonary Function Test     albuterol (PROAIR HFA/PROVENTIL HFA/VENTOLIN HFA) 108 (90 Base) MCG/ACT inhaler     spacer (OPTICHAMBER KENA) holding chamber   2. Chronic coughing  R05.3 XR Chest 2 Views     General PFT Lab (Please always keep checked)     Pulmonary Function Test     albuterol (PROAIR HFA/PROVENTIL HFA/VENTOLIN HFA) 108 (90 Base) MCG/ACT inhaler     spacer (OPTICHAMBER KENA) holding chamber   3. Wheezing without diagnosis of asthma  R06.2 XR Chest 2 Views     General PFT Lab (Please always keep checked)     Pulmonary Function Test     albuterol (PROAIR HFA/PROVENTIL HFA/VENTOLIN HFA) 108 (90 Base) MCG/ACT inhaler     spacer (OPTICHAMBER KENA) holding chamber     PLAN:  1.  Discussed management of patient's symptoms.  Potential for obstructive process, nasal allergies or combination of the two.  See below for patient instructions for recommendations and discussion on allergy management.  Pulmonary function testing ordered.  Will have her trial albuterol inhaler with Optichamber spacer as well.  Chest x-ray today as well.    Patient Instructions   1.  Saline sinus rinse (one form comes in a squirt bottle form while another kind is known as a Neti Pots).  Follow directions on package.  May do this 1-2 times per day.  2.  Flonase (fluticasone):  use as directed on package or prescription.  A good idea is to use this medication with saline nasal irrigation.  If you choose to do this, use the fluticasone about 30-45 minutes after the sinus rinse to maximize effect of medication.    3.  Antihistamines:  Daytime:  Zyrtec (cetirizine) 10 mg once to twice daily.                   BMI:   Estimated body mass index is 35.88 kg/m  as calculated from the following:    Height as of this encounter: 1.651 m (5' 5\").    Weight as of this encounter: 97.8 kg " (215 lb 9.6 oz).           Return in about 4 weeks (around 5/20/2022) for breathing recheck.    Jesse Patel MD  [facility] FRANCESCA Fowler is a 37 year old who presents for the following health issues     Cough    History of Present Illness       Mental Health Follow-up:                    Today's PHQ-9         PHQ-9 Total Score: 1  PHQ-9 Q9 Thoughts of better off dead/self-harm past 2 weeks :   (P) Not at all    How difficult have these problems made it for you to do your work, take care of things at home, or get along with other people: Not difficult at all    Today's DEEDEE-7 Score: 0    Reason for visit:  Cough, shortness of breath  Symptom onset:  More than a month  Symptom intensity:  Moderate  Symptom progression:  Worsening  Had these symptoms before:  Yes  Has tried/received treatment for these symptoms:  No  What makes it worse:  High activity, exercise  What makes it better:  Rest    She eats 2-3 servings of fruits and vegetables daily.She consumes 0 sweetened beverage(s) daily.She exercises with enough effort to increase her heart rate 30 to 60 minutes per day.  She exercises with enough effort to increase her heart rate 4 days per week.   She is taking medications regularly.            No past history of asthma or tobacco use.  Chronic, dry cough.  Worse at night, and in the morning, but bad during the day as well.  Some postnasal drip.  Not much for productive cough or phlegm.  No history of worsening GERD symptoms.      Review of Systems   Respiratory: Positive for cough.             Objective    /70   Pulse 95   Temp 96.9  F (36.1  C) (Temporal)   Resp 16   Wt 97.8 kg (215 lb 9.6 oz)   LMP 04/19/2022   SpO2 98%   BMI 38.19 kg/m    Body mass index is 38.19 kg/m .  Physical Exam  Constitutional:       Appearance: She is well-developed.   HENT:      Right Ear: Ear canal normal. No middle ear effusion.      Left Ear: Ear canal normal.  No middle ear  effusion.      Nose: Nose normal. No mucosal edema, congestion or rhinorrhea.      Right Sinus: No maxillary sinus tenderness or frontal sinus tenderness.      Left Sinus: No maxillary sinus tenderness or frontal sinus tenderness.   Cardiovascular:      Rate and Rhythm: Normal rate and regular rhythm.      Heart sounds: Normal heart sounds, S1 normal and S2 normal. No murmur heard.  Pulmonary:      Effort: Pulmonary effort is normal. No respiratory distress.      Breath sounds: Wheezing (inspiratory and expieratory) present. No rhonchi or rales.   Neurological:      Mental Status: She is alert.

## 2022-04-22 NOTE — PATIENT INSTRUCTIONS
1.  Saline sinus rinse (one form comes in a squirt bottle form while another kind is known as a Neti Pots).  Follow directions on package.  May do this 1-2 times per day.  2.  Flonase (fluticasone):  use as directed on package or prescription.  A good idea is to use this medication with saline nasal irrigation.  If you choose to do this, use the fluticasone about 30-45 minutes after the sinus rinse to maximize effect of medication.    3.  Antihistamines:  Daytime:  Zyrtec (cetirizine) 10 mg once to twice daily.

## 2022-04-22 NOTE — RESULT ENCOUNTER NOTE
Janeth,  Your results are normal.  Please let me know if you have any questions.    Sincerely,  Dr. Patel

## 2022-05-01 ENCOUNTER — MYC MEDICAL ADVICE (OUTPATIENT)
Dept: FAMILY MEDICINE | Facility: CLINIC | Age: 37
End: 2022-05-01
Payer: COMMERCIAL

## 2022-05-02 NOTE — TELEPHONE ENCOUNTER
Pulmonary function testing has been ordered.  See 4/22/2022 notes.  Will have staff notify patient to help schedule appointment.    Jesse Patel MD

## 2022-05-02 NOTE — TELEPHONE ENCOUNTER
im not seeing the pulmonary function test was ordered. Please let me know if it was?    Tara Knox MA 5/2/2022

## 2022-05-04 NOTE — TELEPHONE ENCOUNTER
I faxed the referral to pulmonary. I also called an d spoke to them, they are going to call patient to set up appointment. I also gave patient the number if she doesn't hear from them in a couple days.    Thank you.  Will SHAVER

## 2022-05-21 ENCOUNTER — MYC REFILL (OUTPATIENT)
Dept: FAMILY MEDICINE | Facility: CLINIC | Age: 37
End: 2022-05-21
Payer: COMMERCIAL

## 2022-05-21 DIAGNOSIS — R05.3 CHRONIC COUGHING: ICD-10-CM

## 2022-05-21 DIAGNOSIS — R06.02 SHORTNESS OF BREATH: ICD-10-CM

## 2022-05-21 DIAGNOSIS — R06.2 WHEEZING WITHOUT DIAGNOSIS OF ASTHMA: ICD-10-CM

## 2022-05-24 RX ORDER — ALBUTEROL SULFATE 90 UG/1
2 AEROSOL, METERED RESPIRATORY (INHALATION) EVERY 6 HOURS
Qty: 18 G | Refills: 1 | Status: SHIPPED | OUTPATIENT
Start: 2022-05-24 | End: 2022-06-21

## 2022-05-24 NOTE — TELEPHONE ENCOUNTER
Albuterol proair  Prescription approved per [facility] Refill Protocol.    Arina Lozano RN

## 2022-06-20 ENCOUNTER — HOSPITAL ENCOUNTER (OUTPATIENT)
Dept: RESPIRATORY THERAPY | Facility: CLINIC | Age: 37
Discharge: HOME | End: 2022-06-20
Attending: FAMILY MEDICINE | Admitting: FAMILY MEDICINE
Payer: COMMERCIAL

## 2022-06-20 DIAGNOSIS — R06.2 WHEEZING WITHOUT DIAGNOSIS OF ASTHMA: ICD-10-CM

## 2022-06-20 DIAGNOSIS — R06.02 SHORTNESS OF BREATH: ICD-10-CM

## 2022-06-20 DIAGNOSIS — R05.3 CHRONIC COUGHING: ICD-10-CM

## 2022-06-20 PROCEDURE — 94729 DIFFUSING CAPACITY: CPT

## 2022-06-20 PROCEDURE — 250N000009 HC RX 250: Performed by: FAMILY MEDICINE

## 2022-06-20 PROCEDURE — 94060 EVALUATION OF WHEEZING: CPT

## 2022-06-20 PROCEDURE — 999N000156 HC STATISTIC RCP CONSULT EA 30 MIN

## 2022-06-20 PROCEDURE — 999N000157 HC STATISTIC RCP TIME EA 10 MIN

## 2022-06-20 PROCEDURE — 94726 PLETHYSMOGRAPHY LUNG VOLUMES: CPT

## 2022-06-20 RX ORDER — ALBUTEROL SULFATE 0.83 MG/ML
2.5 SOLUTION RESPIRATORY (INHALATION)
Status: COMPLETED | OUTPATIENT
Start: 2022-06-20 | End: 2022-06-20

## 2022-06-20 RX ADMIN — ALBUTEROL SULFATE 2.5 MG: 2.5 SOLUTION RESPIRATORY (INHALATION) at 08:14

## 2022-06-20 NOTE — PROGRESS NOTES
Pre-Bronch    Post-Bronch         Actual Pred %Pred LLN Actual %Pred %Chng       ---- SPIROMETRY ----                          FVC (L) 3.18 3.83 83 3.06 3.09 80 -2            FEV1 (L) 2.31 3.14 73 2.50 2.30 73 +0            FEV1/FVC (%) 72 82   71 74   +2            FEF 25% (L/sec) 3.65       3.77   +3            FEF 75% (L/sec) 0.82       0.69   -16            FEF 25-75% (L/sec) 1.76 3.29 53 2.01 1.73 52 -2            FEF Max (L/sec) 4.04 7.14 56 5.37 3.90 54 -3            FIVC (L) 2.70       2.72   +0            FIF Max (L/sec) 2.15 5.77 37 3.97 2.52 43 +17            MVV (L/min)   106   88                  MEP (cmH2O)                          MIP (cmH2O)                                                     ---- LUNG VOLUMES ----                          SVC (L) 3.10 3.84 80 3.06                  IC (L) 2.02 3.13 64                    ERV (L) 1.09 0.70 154                    FRC (N2) (L)   2.74   1.74                  RV (N2) (L)   1.61   0.91                  TLC (N2) (L)   5.11   3.91                  RV/TLC (N2) (%)   32   21                  Washout Time (min)                          FRC(Pleth) (L) 3.57 2.74 130 1.74                  RV (Pleth) (L) 2.48 1.61 153 0.91                  TLC (Pleth) (L) 5.58 5.11 109 3.91                  RV/TLC (Pleth) (%) 44 32   21                  Trapped Gas (L)                                                     ---- DIFFUSION ----                          DLCOunc (ml/min/mmHg) 32.14 22.69 141 16.71                  DLCOcor (ml/min/mmHg)   22.69   16.71                  DL/VA (ml/min/mmHg/L) 6.93 4.42                      VA (L) 4.64 5.14 90 4.19                  Hgb (gm/dL)   12-18                                                 ---- AIRWAYS RESISTANCE ----                          Raw (cmH2O/L/s) 1.89 2.24 84                    Gaw (L/s/cmH2O) 0.58 1.03 56                    sRaw (cmH2O*s) 5.59 < 4.76                      sGaw (1/cmH2O*s) 0.19 0.10 183

## 2022-06-20 NOTE — DISCHARGE INSTRUCTIONS
Thank you for completing pulmonary function testing today.  All results will be scanned into your epic results for your doctor to review.  Please resume taking all your current prescribed medications and diet as directed by your provider.   If you have not heard from your provider about your testing within two weeks and do not have a follow-up appointment scheduled with them please contact your provider about any questions you have concerning your testing.   Thank you  [facility] Pulmonary Function Lab

## 2022-06-21 ENCOUNTER — OFFICE VISIT (OUTPATIENT)
Dept: FAMILY MEDICINE | Facility: CLINIC | Age: 37
End: 2022-06-21
Payer: COMMERCIAL

## 2022-06-21 VITALS
OXYGEN SATURATION: 99 % | RESPIRATION RATE: 18 BRPM | BODY MASS INDEX: 35.61 KG/M2 | TEMPERATURE: 97.8 F | DIASTOLIC BLOOD PRESSURE: 80 MMHG | WEIGHT: 214 LBS | SYSTOLIC BLOOD PRESSURE: 126 MMHG | HEART RATE: 98 BPM

## 2022-06-21 DIAGNOSIS — Z11.59 NEED FOR HEPATITIS C SCREENING TEST: ICD-10-CM

## 2022-06-21 DIAGNOSIS — J44.9 CHRONIC OBSTRUCTIVE PULMONARY DISEASE, UNSPECIFIED COPD TYPE (H): Primary | ICD-10-CM

## 2022-06-21 DIAGNOSIS — E66.01 SEVERE OBESITY (BMI 35.0-39.9) WITH COMORBIDITY (H): ICD-10-CM

## 2022-06-21 LAB
DLCOUNC-%PRED-PRE: 141 %
DLCOUNC-PRE: 32.14 ML/MIN/MMHG
DLCOUNC-PRED: 22.69 ML/MIN/MMHG
ERV-%PRED-PRE: 154 %
ERV-PRE: 1.09 L
ERV-PRED: 0.7 L
EXPTIME-PRE: 7.3 SEC
FEF2575-%PRED-POST: 52 %
FEF2575-%PRED-PRE: 53 %
FEF2575-POST: 1.73 L/SEC
FEF2575-PRE: 1.76 L/SEC
FEF2575-PRED: 3.29 L/SEC
FEFMAX-%PRED-PRE: 56 %
FEFMAX-PRE: 4.04 L/SEC
FEFMAX-PRED: 7.14 L/SEC
FEV1-%PRED-PRE: 73 %
FEV1-PRE: 2.31 L
FEV1FEV6-PRE: 72 %
FEV1FEV6-PRED: 84 %
FEV1FVC-PRE: 72 %
FEV1FVC-PRED: 82 %
FEV1SVC-PRE: 74 %
FEV1SVC-PRED: 82 %
FIFMAX-PRE: 2.15 L/SEC
FRCPLETH-%PRED-PRE: 130 %
FRCPLETH-PRE: 3.57 L
FRCPLETH-PRED: 2.74 L
FVC-%PRED-PRE: 83 %
FVC-PRE: 3.18 L
FVC-PRED: 3.83 L
GAW-%PRED-PRE: 56 %
GAW-PRE: 0.58 L/S/CMH2O
GAW-PRED: 1.03 L/S/CMH2O
IC-%PRED-PRE: 64 %
IC-PRE: 2.02 L
IC-PRED: 3.13 L
RVPLETH-%PRED-PRE: 153 %
RVPLETH-PRE: 2.48 L
RVPLETH-PRED: 1.61 L
SGAW-%PRED-PRE: 183 %
SGAW-PRE: 0.19 1/CMH2O*S
SGAW-PRED: 0.1 1/CMH2O*S
SRAW-%PRED-PRE: 117 %
SRAW-PRE: 5.59 CMH2O*S
SRAW-PRED: 4.76 CMH2O*S
TLCPLETH-%PRED-PRE: 109 %
TLCPLETH-PRE: 5.58 L
TLCPLETH-PRED: 5.11 L
VA-%PRED-PRE: 90 %
VA-PRE: 4.64 L
VC-%PRED-PRE: 80 %
VC-PRE: 3.1 L
VC-PRED: 3.84 L

## 2022-06-21 PROCEDURE — 36415 COLL VENOUS BLD VENIPUNCTURE: CPT | Performed by: FAMILY MEDICINE

## 2022-06-21 PROCEDURE — 99000 SPECIMEN HANDLING OFFICE-LAB: CPT | Performed by: FAMILY MEDICINE

## 2022-06-21 PROCEDURE — 86803 HEPATITIS C AB TEST: CPT | Performed by: FAMILY MEDICINE

## 2022-06-21 PROCEDURE — 81332 SERPINA1 GENE: CPT | Mod: 90 | Performed by: FAMILY MEDICINE

## 2022-06-21 PROCEDURE — 82103 ALPHA-1-ANTITRYPSIN TOTAL: CPT | Mod: 90 | Performed by: FAMILY MEDICINE

## 2022-06-21 PROCEDURE — 99214 OFFICE O/P EST MOD 30 MIN: CPT | Performed by: FAMILY MEDICINE

## 2022-06-21 RX ORDER — ALBUTEROL SULFATE 90 UG/1
2 AEROSOL, METERED RESPIRATORY (INHALATION) EVERY 6 HOURS
Qty: 18 G | Refills: 4 | Status: SHIPPED | OUTPATIENT
Start: 2022-06-21 | End: 2023-01-17

## 2022-06-21 ASSESSMENT — PAIN SCALES - GENERAL: PAINLEVEL: NO PAIN (0)

## 2022-06-21 NOTE — PROGRESS NOTES
"  Assessment & Plan     ASSESSMENT/ORDERS:    ICD-10-CM    1. Chronic obstructive pulmonary disease, unspecified COPD type (H)  J44.9 Alpha 1 antitryp piotr reflex to pheno     umeclidinium (INCRUSE ELLIPTA) 62.5 MCG/INH inhaler     Alpha 1 antitryp piotr reflex to pheno   2. Severe obesity (BMI 35.0-39.9) with comorbidity (H)  E66.01    3. Need for hepatitis C screening test  Z11.59 Hepatitis C Screen Reflex to HCV RNA Quant and Genotype     Hepatitis C Screen Reflex to HCV RNA Quant and Genotype     PLAN:  1.  Discussed results of pulmonary function testing and consistent with obstructive pattern.  With no reversibility from albuterol, may be more of a COPD picture.  Given young age and family medical history of severe COPD, discussed getting genetic teseting for alpha 1 antitrypsan disease.  Risks/benefits of genetic testing discussed.  Will start on Incruse Ellipta and also give refills of albuterol.  2.  Discussed implications of weight on breathing disease and weight loss recommended.       38 minutes spent on the date of the encounter doing chart review, review of test results, interpretation of tests and patient visit        BMI:   Estimated body mass index is 35.61 kg/m  as calculated from the following:    Height as of 4/22/22: 1.651 m (5' 5\").    Weight as of this encounter: 97.1 kg (214 lb).           Return in about 4 weeks (around 7/19/2022) for recheck breathing.    Jesse Patel MD  [facility] FRANCESCA Fowler is a 37 year old, presenting for the following health issues:  Recheck Medication      History of Present Illness       Reason for visit:  Follow up after pulmonary screening    She eats 2-3 servings of fruits and vegetables daily.She consumes 0 sweetened beverage(s) daily.She exercises with enough effort to increase her heart rate 30 to 60 minutes per day.  She exercises with enough effort to increase her heart rate 3 or less days per week.   She is taking " medications regularly.           Here for review of pulmonary function testing.  Did notice benefit of post spirometry neb despite lack of documented benefit objectively.  pateint noted albuterol trial has been very effecdtive since last in-person office visit.  Using spacer with albuterol.  Is interested in long term medication.      Tobacco Use      Smoking status: Former Smoker        Packs/day: 0.50        Years: 5.00        Pack years: 2.5        Types: Cigarettes        Quit date: 2020        Years since quittin.4      Smokeless tobacco: Never Used     Family medical history of severe COPD.  Is wondering if it can be hereditary.     No prior history of asthma or use of albuterol inhaler.      Review of Systems         Objective    /80   Pulse 98   Temp 97.8  F (36.6  C) (Temporal)   Resp 18   Wt 97.1 kg (214 lb)   LMP 2022   SpO2 99%   Breastfeeding No   BMI 35.61 kg/m    Body mass index is 35.61 kg/m .  Physical Exam  Constitutional:       Appearance: She is well-developed. She is obese.   Cardiovascular:      Rate and Rhythm: Normal rate and regular rhythm.      Heart sounds: Normal heart sounds, S1 normal and S2 normal. No murmur heard.  Pulmonary:      Effort: Pulmonary effort is normal. Prolonged expiration present. No respiratory distress.      Breath sounds: Normal breath sounds. Decreased air movement present. No wheezing, rhonchi or rales.   Neurological:      Mental Status: She is alert.                            .  ..

## 2022-06-22 LAB — HCV AB SERPL QL IA: NONREACTIVE

## 2022-06-22 NOTE — RESULT ENCOUNTER NOTE
Janeth,  Your results were consistent with airflow obstruction which we discussed is both found in COPD and asthma.    Continue with medication trial as we discussed.  We are still awaiting results of the blood test we did.  Please let me know if you have any questions.    Sincerely,  Dr. Patel

## 2022-06-27 LAB
A1AT PHENOTYP SERPL-IMP: NORMAL
A1AT SERPL-MCNC: 134 MG/DL
A1AT SS SERPL-MCNC: NEGATIVE G/L
A1AT SZ SERPL-MCNC: NORMAL G/L
A1AT ZZ SERPL-MCNC: NEGATIVE G/L
SPECIMEN SOURCE: NORMAL

## 2022-07-01 NOTE — RESULT ENCOUNTER NOTE
Janeth,  Your results were both negative for disease.  Please let me know if you have any questions.    Sincerely,  Dr. Patel

## 2022-07-19 ENCOUNTER — OFFICE VISIT (OUTPATIENT)
Dept: FAMILY MEDICINE | Facility: CLINIC | Age: 37
End: 2022-07-19
Payer: COMMERCIAL

## 2022-07-19 VITALS
BODY MASS INDEX: 35.51 KG/M2 | TEMPERATURE: 97.1 F | OXYGEN SATURATION: 98 % | HEART RATE: 79 BPM | WEIGHT: 213.38 LBS | DIASTOLIC BLOOD PRESSURE: 78 MMHG | SYSTOLIC BLOOD PRESSURE: 126 MMHG

## 2022-07-19 DIAGNOSIS — J44.9 CHRONIC OBSTRUCTIVE PULMONARY DISEASE, UNSPECIFIED COPD TYPE (H): ICD-10-CM

## 2022-07-19 PROCEDURE — 99213 OFFICE O/P EST LOW 20 MIN: CPT | Mod: 25 | Performed by: FAMILY MEDICINE

## 2022-07-19 PROCEDURE — 90677 PCV20 VACCINE IM: CPT | Performed by: FAMILY MEDICINE

## 2022-07-19 PROCEDURE — 90471 IMMUNIZATION ADMIN: CPT | Performed by: FAMILY MEDICINE

## 2022-07-19 ASSESSMENT — PAIN SCALES - GENERAL: PAINLEVEL: NO PAIN (0)

## 2022-07-19 NOTE — PROGRESS NOTES
"  Assessment & Plan     ASSESSMENT/ORDERS:    ICD-10-CM    1. Chronic obstructive pulmonary disease, unspecified COPD type (H)  J44.9 umeclidinium (INCRUSE ELLIPTA) 62.5 MCG/INH inhaler     PLAN:  1.  Medication refilled for stable COPD.  Follow-up in 6 months.              BMI:   Estimated body mass index is 35.51 kg/m  as calculated from the following:    Height as of 4/22/22: 1.651 m (5' 5\").    Weight as of this encounter: 96.8 kg (213 lb 6 oz).   Weight management plan: Discussed healthy diet and exercise guidelines        Return for COPD recheck.    Jesse Patel MD  [facility] FRANCESCA Fowler is a 37 year old, presenting for the following health issues:  COPD      History of Present Illness     Asthma:  She presents for follow up of asthma.  She has no cough, no wheezing, and no shortness of breath. She is using a relief medication a few times a week. She does not have a controller medication. Patient is aware of the following triggers: none. The patient has not had a visit to the Emergency Room, Urgent Care or Hospital due to asthma since the last clinic visit.     COPD:  She presents for follow up of COPD.  Overall, COPD symptoms are better since last visit. She has less than usual fatigue or shortness of breath with exertion and no shortness of breath at rest.She rarely coughs and does have change in sputum. No recent fever. She can walk 1-2 miles without stopping to rest. She can walk 3 or more flights of stairs without resting.The patient has had no ED, urgent care, or hospital admissions because of COPD since the last visit.     She eats 2-3 servings of fruits and vegetables daily.She consumes 0 sweetened beverage(s) daily.She exercises with enough effort to increase her heart rate 20 to 29 minutes per day.  She exercises with enough effort to increase her heart rate 3 or less days per week.   She is taking medications regularly.           Breathing vastly better " with Incruse Ellipta.  Hardly needing albuterol.  n omed side effects.    Review of Systems         Objective    /78   Pulse 79   Temp 97.1  F (36.2  C) (Temporal)   Wt 96.8 kg (213 lb 6 oz)   LMP 05/20/2022   SpO2 98%   BMI 35.51 kg/m    Body mass index is 35.51 kg/m .  Physical Exam  Constitutional:       Appearance: She is well-developed.   Cardiovascular:      Rate and Rhythm: Normal rate and regular rhythm.      Heart sounds: Normal heart sounds, S1 normal and S2 normal. No murmur heard.  Pulmonary:      Effort: Pulmonary effort is normal. No respiratory distress.      Breath sounds: Normal breath sounds. No wheezing, rhonchi or rales.   Neurological:      Mental Status: She is alert.                            .  ..

## 2022-07-19 NOTE — NURSING NOTE
Prior to immunization administration, verified patients identity using patient s name and date of birth. Please see Immunization Activity for additional information.     Screening Questionnaire for Adult Immunization    Are you sick today?   No   Do you have allergies to medications, food, a vaccine component or latex?   No   Have you ever had a serious reaction after receiving a vaccination?   No   Do you have a long-term health problem with heart, lung, kidney, or metabolic disease (e.g., diabetes), asthma, a blood disorder, no spleen, complement component deficiency, a cochlear implant, or a spinal fluid leak?  Are you on long-term aspirin therapy?   No   Do you have cancer, leukemia, HIV/AIDS, or any other immune system problem?   No   Do you have a parent, brother, or sister with an immune system problem?   No   In the past 3 months, have you taken medications that affect  your immune system, such as prednisone, other steroids, or anticancer drugs; drugs for the treatment of rheumatoid arthritis, Crohn s disease, or psoriasis; or have you had radiation treatments?   No   Have you had a seizure, or a brain or other nervous system problem?   No   During the past year, have you received a transfusion of blood or blood    products, or been given immune (gamma) globulin or antiviral drug?   No   For women: Are you pregnant or is there a chance you could become       pregnant during the next month?   No   Have you received any vaccinations in the past 4 weeks?   No     Immunization questionnaire answers were all negative.        Per orders of Dr. Patel, injection of Prevnar 20 given by Bhumika Minor CMA. Patient instructed to remain in clinic for 15 minutes afterwards, and to report any adverse reaction to me immediately.       Screening performed by Bhumika Minor CMA on 7/19/2022 at 12:12 PM.     coffee

## 2022-12-25 ENCOUNTER — HEALTH MAINTENANCE LETTER (OUTPATIENT)
Age: 37
End: 2022-12-25

## 2023-01-17 ENCOUNTER — OFFICE VISIT (OUTPATIENT)
Dept: FAMILY MEDICINE | Facility: CLINIC | Age: 38
End: 2023-01-17
Payer: COMMERCIAL

## 2023-01-17 VITALS
DIASTOLIC BLOOD PRESSURE: 64 MMHG | HEART RATE: 70 BPM | OXYGEN SATURATION: 98 % | WEIGHT: 224.13 LBS | SYSTOLIC BLOOD PRESSURE: 108 MMHG | TEMPERATURE: 97.1 F | HEIGHT: 65 IN | BODY MASS INDEX: 37.34 KG/M2

## 2023-01-17 DIAGNOSIS — J44.9 CHRONIC OBSTRUCTIVE PULMONARY DISEASE, UNSPECIFIED COPD TYPE (H): Primary | ICD-10-CM

## 2023-01-17 DIAGNOSIS — R06.02 SHORTNESS OF BREATH: ICD-10-CM

## 2023-01-17 DIAGNOSIS — E66.01 SEVERE OBESITY (BMI 35.0-39.9) WITH COMORBIDITY (H): ICD-10-CM

## 2023-01-17 DIAGNOSIS — R05.3 CHRONIC COUGHING: ICD-10-CM

## 2023-01-17 PROCEDURE — 99213 OFFICE O/P EST LOW 20 MIN: CPT | Performed by: FAMILY MEDICINE

## 2023-01-17 RX ORDER — ALBUTEROL SULFATE 90 UG/1
2 AEROSOL, METERED RESPIRATORY (INHALATION) EVERY 6 HOURS
Qty: 18 G | Refills: 4 | Status: SHIPPED | OUTPATIENT
Start: 2023-01-17

## 2023-01-17 RX ORDER — TIOTROPIUM BROMIDE 18 UG/1
18 CAPSULE ORAL; RESPIRATORY (INHALATION) DAILY
Qty: 30 CAPSULE | Refills: 12 | Status: SHIPPED | OUTPATIENT
Start: 2023-01-17 | End: 2024-03-15

## 2023-01-17 RX ORDER — INHALER, ASSIST DEVICES
SPACER (EA) MISCELLANEOUS
Qty: 1 EACH | Refills: 3 | Status: SHIPPED | OUTPATIENT
Start: 2023-01-17 | End: 2024-03-15

## 2023-01-17 ASSESSMENT — PATIENT HEALTH QUESTIONNAIRE - PHQ9
SUM OF ALL RESPONSES TO PHQ QUESTIONS 1-9: 0
10. IF YOU CHECKED OFF ANY PROBLEMS, HOW DIFFICULT HAVE THESE PROBLEMS MADE IT FOR YOU TO DO YOUR WORK, TAKE CARE OF THINGS AT HOME, OR GET ALONG WITH OTHER PEOPLE: NOT DIFFICULT AT ALL
SUM OF ALL RESPONSES TO PHQ QUESTIONS 1-9: 0

## 2023-01-17 NOTE — PROGRESS NOTES
"  Assessment & Plan     ASSESSMENT/ORDERS:    ICD-10-CM    1. Chronic obstructive pulmonary disease, unspecified COPD type (H)  J44.9 tiotropium (SPIRIVA) 18 MCG inhaled capsule     albuterol (PROAIR HFA/PROVENTIL HFA/VENTOLIN HFA) 108 (90 Base) MCG/ACT inhaler      2. Severe obesity (BMI 35.0-39.9) with comorbidity (H)  E66.01       3. Shortness of breath  R06.02 spacer (OPTICHAMBER KENA) holding chamber      4. Chronic coughing  R05.3 spacer (OPTICHAMBER KENA) holding chamber        PLAN:  1.  Changed COPD medication to Spiriva per insurance needs.  2.  Discussed impact of severe obesity on COPD and shortness of breath.    3.  See below for patient instructions for recommendations and discussion on vaccines due.    Patient Instructions   TDaP, flu are due (twinrix hepA/B combo if desired); schedule nurse visit.                  BMI:   Estimated body mass index is 37.3 kg/m  as calculated from the following:    Height as of this encounter: 1.651 m (5' 5\").    Weight as of this encounter: 101.7 kg (224 lb 2 oz).           Return in about 6 months (around 7/17/2023) for COPD recheck, next preventative visit (Physical).    Jesse Patel MD  [facility] FRANCESCA Fowler is a 37 year old presenting for the following health issues:  Recheck Medication      History of Present Illness       COPD:  She presents for follow up of COPD.  Overall, COPD symptoms are stable since last visit. She has no fatigue or shortness of breath with exertion and no shortness of breath at rest.She does not cough  and does not have change in sputum. No recent fever. She can walk 1-2 miles without stopping to rest. She can walk 3 or more flights of stairs without resting.The patient has had no ED, urgent care, or hospital admissions because of COPD since the last visit.     She eats 2-3 servings of fruits and vegetables daily.She consumes 0 sweetened beverage(s) daily.She exercises with enough effort to " "increase her heart rate 10 to 19 minutes per day.  She exercises with enough effort to increase her heart rate 4 days per week.   She is taking medications regularly.    Today's PHQ-9         PHQ-9 Total Score: 0    PHQ-9 Q9 Thoughts of better off dead/self-harm past 2 weeks :   Not at all    How difficult have these problems made it for you to do your work, take care of things at home, or get along with other people: Not difficult at all       Asthma Follow-Up    Was ACT completed today?  No      Do you have a cough?  No    Are you experiencing any wheezing in your chest?  No    Do you have any shortness of breath?  No     How often are you using a short-acting (rescue) inhaler or nebulizer, such as Albuterol?  Never    How many days per week do you miss taking your asthma controller medication?  0    Please describe any recent triggers for your asthma: smoke and upper respiratory infections    Have you had any Emergency Room Visits, Urgent Care Visits, or Hospital Admissions since your last office visit?  No      insurnace no longer covering Incruse Ellipta.  She wants to know what to do.  They will cover Spiriva.    Review of Systems         Objective    /64   Pulse 70   Temp 97.1  F (36.2  C) (Temporal)   Ht 1.651 m (5' 5\")   Wt 101.7 kg (224 lb 2 oz)   LMP 11/01/2022 (Approximate)   SpO2 98%   BMI 37.30 kg/m    Body mass index is 37.3 kg/m .  Physical Exam  Constitutional:       Appearance: She is well-developed.   Cardiovascular:      Rate and Rhythm: Normal rate and regular rhythm.      Heart sounds: Normal heart sounds, S1 normal and S2 normal. No murmur heard.  Pulmonary:      Effort: Pulmonary effort is normal. No respiratory distress.      Breath sounds: Normal breath sounds. No wheezing, rhonchi or rales.   Neurological:      Mental Status: She is alert.                            "

## 2023-04-16 ENCOUNTER — HEALTH MAINTENANCE LETTER (OUTPATIENT)
Age: 38
End: 2023-04-16

## 2023-07-28 ENCOUNTER — ANCILLARY ORDERS (OUTPATIENT)
Dept: MAMMOGRAPHY | Facility: CLINIC | Age: 38
End: 2023-07-28

## 2023-07-28 DIAGNOSIS — Z12.31 VISIT FOR SCREENING MAMMOGRAM: Primary | ICD-10-CM

## 2024-02-04 ENCOUNTER — HEALTH MAINTENANCE LETTER (OUTPATIENT)
Age: 39
End: 2024-02-04

## 2024-02-26 ENCOUNTER — HOSPITAL ENCOUNTER (OUTPATIENT)
Dept: ULTRASOUND IMAGING | Facility: CLINIC | Age: 39
Discharge: HOME | End: 2024-02-26
Attending: NURSE PRACTITIONER | Admitting: NURSE PRACTITIONER
Payer: COMMERCIAL

## 2024-02-26 ENCOUNTER — MEDICAL CORRESPONDENCE (OUTPATIENT)
Dept: HEALTH INFORMATION MANAGEMENT | Facility: CLINIC | Age: 39
End: 2024-02-26
Payer: COMMERCIAL

## 2024-02-26 DIAGNOSIS — N73.0 PID (ACUTE PELVIC INFLAMMATORY DISEASE): ICD-10-CM

## 2024-02-26 DIAGNOSIS — Z97.5 IUD (INTRAUTERINE DEVICE) IN PLACE: ICD-10-CM

## 2024-02-26 PROCEDURE — 76856 US EXAM PELVIC COMPLETE: CPT

## 2024-02-26 PROCEDURE — 76830 TRANSVAGINAL US NON-OB: CPT

## 2024-02-27 ENCOUNTER — TRANSCRIBE ORDERS (OUTPATIENT)
Dept: OTHER | Age: 39
End: 2024-02-27

## 2024-02-27 DIAGNOSIS — N73.9 PELVIC INFECTION IN FEMALE: Primary | ICD-10-CM

## 2024-03-07 NOTE — PATIENT INSTRUCTIONS
If you have any questions regarding your visit, Please contact your care team.     [employer] [facility]: [phone]    To Schedule an Appointment 24/7  Call: [phone] TELE[facility]ONE NUM[facility]     Gaudencio Brennan MD  Medical Director        Maya-GRAEME Wade-RN  Anni Rodriguez-Surgery Scheduler  Tamanna-Surgery Scheduler               Tuesday-[place]  7:30 a.m-4:30 p.m    Thursday-[place]  7:30 a.m-4:30 p.m    Typical Surgery Days: Tuesday or Friday [facility]  [address] [place] [address]  PH: [phone]    Imaging Scheduling all locations  PH: [phone]     [facility]  [address] [place] [address] [place] [address]  PH: [phone]    [facility]  [address] [place] [address]  PH: [phone] Fax      **Surgeries** Our Surgery Schedulers will contact you to schedule. If you do not receive a call within 3 business days, please call [phone].    Urgent Care locations:  [facility] Monday-Friday  10 am - 8 pm  Saturday and Sunday   9 am - 5 pm  Monday-Friday   10 am - 8 pm  Saturday and Sunday   9 am - 5 pm   [phone]   If you need a medication refill, please contact your pharmacy. Please allow 3 business days for your refill to be completed.  As always, Thank you for trusting us with your healthcare needs!    see additional instructions from your care team below

## 2024-03-07 NOTE — PROGRESS NOTES
IUD Removal:  SUBJECTIVE:    Is a pregnancy test required: No.  Was a consent obtained?  Yes    Janeth Argueta is a 38 year old female,, No LMP recorded. who presents today for IUD removal. Her current IUD was placed 12 ago. She has not had problems with the IUD. She requests removal of the IUD because the IUD effectiveness has     Today's PHQ-2 Score:       2022    10:15 AM   PHQ-2 (  [employer])   Q1: Little interest or pleasure in doing things 0   Q2: Feeling down, depressed or hopeless 0   PHQ-2 Score 0   Q1: Little interest or pleasure in doing things Not at all   Q2: Feeling down, depressed or hopeless Not at all   PHQ-2 Score 0       PROCEDURE:    A speculum exam was performed and the cervix was visualized. The IUD string was visualized. Using ring forceps, the string  was grasped and the IUD removed intact.    POST PROCEDURE:    The patient tolerated the procedure well. Patient was discharged in stable condition.    Call if bleeding, pain or fever occur. and Birth control counseling given.    Gaudencio Brennan MD

## 2024-03-11 ENCOUNTER — OFFICE VISIT (OUTPATIENT)
Dept: OBGYN | Facility: CLINIC | Age: 39
End: 2024-03-11
Payer: COMMERCIAL

## 2024-03-11 VITALS
DIASTOLIC BLOOD PRESSURE: 76 MMHG | BODY MASS INDEX: 34.28 KG/M2 | SYSTOLIC BLOOD PRESSURE: 115 MMHG | HEART RATE: 75 BPM | WEIGHT: 206 LBS | OXYGEN SATURATION: 99 %

## 2024-03-11 DIAGNOSIS — N73.9 PELVIC INFECTION IN FEMALE: ICD-10-CM

## 2024-03-11 DIAGNOSIS — Z30.432 ENCOUNTER FOR IUD REMOVAL: Primary | ICD-10-CM

## 2024-03-11 PROCEDURE — 58301 REMOVE INTRAUTERINE DEVICE: CPT | Performed by: OBSTETRICS & GYNECOLOGY

## 2024-03-14 ENCOUNTER — MEDICAL CORRESPONDENCE (OUTPATIENT)
Dept: HEALTH INFORMATION MANAGEMENT | Facility: CLINIC | Age: 39
End: 2024-03-14
Payer: COMMERCIAL

## 2024-03-14 ENCOUNTER — TRANSFERRED RECORDS (OUTPATIENT)
Dept: HEALTH INFORMATION MANAGEMENT | Facility: CLINIC | Age: 39
End: 2024-03-14

## 2024-03-14 ENCOUNTER — HOSPITAL ENCOUNTER (OUTPATIENT)
Dept: MRI IMAGING | Facility: CLINIC | Age: 39
Discharge: HOME | End: 2024-03-14
Attending: NURSE PRACTITIONER | Admitting: NURSE PRACTITIONER
Payer: COMMERCIAL

## 2024-03-14 DIAGNOSIS — N73.9 PID (PELVIC INFLAMMATORY DISEASE): ICD-10-CM

## 2024-03-14 DIAGNOSIS — R10.2 PELVIC PAIN IN FEMALE: ICD-10-CM

## 2024-03-14 PROCEDURE — 255N000002 HC RX 255 OP 636: Performed by: NURSE PRACTITIONER

## 2024-03-14 PROCEDURE — 72197 MRI PELVIS W/O & W/DYE: CPT

## 2024-03-14 PROCEDURE — A9585 GADOBUTROL INJECTION: HCPCS | Performed by: NURSE PRACTITIONER

## 2024-03-14 RX ORDER — GADOBUTROL 604.72 MG/ML
9.5 INJECTION INTRAVENOUS ONCE
Status: COMPLETED | OUTPATIENT
Start: 2024-03-14 | End: 2024-03-14

## 2024-03-14 RX ADMIN — GADOBUTROL 9.5 ML: 604.72 INJECTION INTRAVENOUS at 14:59

## 2024-03-15 ENCOUNTER — APPOINTMENT (OUTPATIENT)
Dept: ULTRASOUND IMAGING | Facility: CLINIC | Age: 39
End: 2024-03-15
Attending: FAMILY MEDICINE
Payer: COMMERCIAL

## 2024-03-15 ENCOUNTER — TRANSCRIBE ORDERS (OUTPATIENT)
Dept: OTHER | Age: 39
End: 2024-03-15

## 2024-03-15 ENCOUNTER — TELEPHONE (OUTPATIENT)
Dept: SURGERY | Facility: CLINIC | Age: 39
End: 2024-03-15
Payer: COMMERCIAL

## 2024-03-15 ENCOUNTER — APPOINTMENT (OUTPATIENT)
Dept: CT IMAGING | Facility: CLINIC | Age: 39
End: 2024-03-15
Attending: FAMILY MEDICINE
Payer: COMMERCIAL

## 2024-03-15 ENCOUNTER — HOSPITAL ENCOUNTER (EMERGENCY)
Facility: CLINIC | Age: 39
Discharge: OTHER INSTITUTION | End: 2024-03-15
Attending: FAMILY MEDICINE | Admitting: FAMILY MEDICINE
Payer: COMMERCIAL

## 2024-03-15 ENCOUNTER — TELEPHONE (OUTPATIENT)
Dept: OBGYN | Facility: CLINIC | Age: 39
End: 2024-03-15

## 2024-03-15 VITALS
RESPIRATION RATE: 18 BRPM | SYSTOLIC BLOOD PRESSURE: 106 MMHG | DIASTOLIC BLOOD PRESSURE: 54 MMHG | TEMPERATURE: 98.2 F | OXYGEN SATURATION: 95 % | HEART RATE: 57 BPM

## 2024-03-15 DIAGNOSIS — N83.201 CYST OF RIGHT OVARY: Primary | ICD-10-CM

## 2024-03-15 DIAGNOSIS — N70.93 RIGHT TUBO-OVARIAN ABSCESS: ICD-10-CM

## 2024-03-15 DIAGNOSIS — K65.1 INTRA-ABDOMINAL ABSCESS (H): ICD-10-CM

## 2024-03-15 LAB
ALBUMIN SERPL BCG-MCNC: 4.2 G/DL (ref 3.5–5.2)
ALBUMIN UR-MCNC: NEGATIVE MG/DL
ALP SERPL-CCNC: 59 U/L (ref 40–150)
ALT SERPL W P-5'-P-CCNC: ABNORMAL U/L
ANION GAP SERPL CALCULATED.3IONS-SCNC: 10 MMOL/L (ref 7–15)
APPEARANCE UR: CLEAR
AST SERPL W P-5'-P-CCNC: 41 U/L (ref 0–45)
BASOPHILS # BLD AUTO: 0.1 10E3/UL (ref 0–0.2)
BASOPHILS NFR BLD AUTO: 1 %
BILIRUB SERPL-MCNC: 0.3 MG/DL
BILIRUB UR QL STRIP: NEGATIVE
BUN SERPL-MCNC: 15.9 MG/DL (ref 6–20)
CALCIUM SERPL-MCNC: 9.2 MG/DL (ref 8.6–10)
CHLORIDE SERPL-SCNC: 104 MMOL/L (ref 98–107)
COLOR UR AUTO: YELLOW
CREAT SERPL-MCNC: 0.6 MG/DL (ref 0.51–0.95)
DEPRECATED HCO3 PLAS-SCNC: 23 MMOL/L (ref 22–29)
EGFRCR SERPLBLD CKD-EPI 2021: >90 ML/MIN/1.73M2
EOSINOPHIL # BLD AUTO: 0.4 10E3/UL (ref 0–0.7)
EOSINOPHIL NFR BLD AUTO: 6 %
ERYTHROCYTE [DISTWIDTH] IN BLOOD BY AUTOMATED COUNT: 12.8 % (ref 10–15)
GLUCOSE SERPL-MCNC: 106 MG/DL (ref 70–99)
GLUCOSE UR STRIP-MCNC: NEGATIVE MG/DL
HCT VFR BLD AUTO: 41.4 % (ref 35–47)
HGB BLD-MCNC: 14.2 G/DL (ref 11.7–15.7)
HGB UR QL STRIP: NEGATIVE
IMM GRANULOCYTES # BLD: 0 10E3/UL
IMM GRANULOCYTES NFR BLD: 0 %
KETONES UR STRIP-MCNC: NEGATIVE MG/DL
LACTATE SERPL-SCNC: 1.4 MMOL/L (ref 0.7–2)
LEUKOCYTE ESTERASE UR QL STRIP: NEGATIVE
LYMPHOCYTES # BLD AUTO: 2.5 10E3/UL (ref 0.8–5.3)
LYMPHOCYTES NFR BLD AUTO: 38 %
MCH RBC QN AUTO: 32.1 PG (ref 26.5–33)
MCHC RBC AUTO-ENTMCNC: 34.3 G/DL (ref 31.5–36.5)
MCV RBC AUTO: 94 FL (ref 78–100)
MONOCYTES # BLD AUTO: 0.6 10E3/UL (ref 0–1.3)
MONOCYTES NFR BLD AUTO: 9 %
MUCOUS THREADS #/AREA URNS LPF: PRESENT /LPF
NEUTROPHILS # BLD AUTO: 3 10E3/UL (ref 1.6–8.3)
NEUTROPHILS NFR BLD AUTO: 46 %
NITRATE UR QL: NEGATIVE
NRBC # BLD AUTO: 0 10E3/UL
NRBC BLD AUTO-RTO: 0 /100
PH UR STRIP: 7 [PH] (ref 5–7)
PLATELET # BLD AUTO: 253 10E3/UL (ref 150–450)
POTASSIUM SERPL-SCNC: 4.7 MMOL/L (ref 3.4–5.3)
PROT SERPL-MCNC: 7.3 G/DL (ref 6.4–8.3)
RBC # BLD AUTO: 4.42 10E6/UL (ref 3.8–5.2)
RBC URINE: 1 /HPF
SODIUM SERPL-SCNC: 137 MMOL/L (ref 135–145)
SP GR UR STRIP: 1.01 (ref 1–1.03)
SQUAMOUS EPITHELIAL: 1 /HPF
UROBILINOGEN UR STRIP-MCNC: NORMAL MG/DL
WBC # BLD AUTO: 6.5 10E3/UL (ref 4–11)
WBC URINE: <1 /HPF

## 2024-03-15 PROCEDURE — 250N000011 HC RX IP 250 OP 636: Performed by: FAMILY MEDICINE

## 2024-03-15 PROCEDURE — 250N000009 HC RX 250: Performed by: FAMILY MEDICINE

## 2024-03-15 PROCEDURE — 96365 THER/PROPH/DIAG IV INF INIT: CPT | Mod: 59 | Performed by: FAMILY MEDICINE

## 2024-03-15 PROCEDURE — 81001 URINALYSIS AUTO W/SCOPE: CPT | Performed by: FAMILY MEDICINE

## 2024-03-15 PROCEDURE — 76830 TRANSVAGINAL US NON-OB: CPT

## 2024-03-15 PROCEDURE — 84155 ASSAY OF PROTEIN SERUM: CPT | Performed by: FAMILY MEDICINE

## 2024-03-15 PROCEDURE — 96366 THER/PROPH/DIAG IV INF ADDON: CPT | Performed by: FAMILY MEDICINE

## 2024-03-15 PROCEDURE — 83605 ASSAY OF LACTIC ACID: CPT | Performed by: FAMILY MEDICINE

## 2024-03-15 PROCEDURE — 87040 BLOOD CULTURE FOR BACTERIA: CPT | Performed by: FAMILY MEDICINE

## 2024-03-15 PROCEDURE — 36415 COLL VENOUS BLD VENIPUNCTURE: CPT | Performed by: FAMILY MEDICINE

## 2024-03-15 PROCEDURE — 85025 COMPLETE CBC W/AUTO DIFF WBC: CPT | Performed by: FAMILY MEDICINE

## 2024-03-15 PROCEDURE — 74177 CT ABD & PELVIS W/CONTRAST: CPT

## 2024-03-15 PROCEDURE — 99285 EMERGENCY DEPT VISIT HI MDM: CPT | Mod: 25 | Performed by: FAMILY MEDICINE

## 2024-03-15 PROCEDURE — 82247 BILIRUBIN TOTAL: CPT | Performed by: FAMILY MEDICINE

## 2024-03-15 PROCEDURE — 99285 EMERGENCY DEPT VISIT HI MDM: CPT | Performed by: FAMILY MEDICINE

## 2024-03-15 RX ORDER — PIPERACILLIN SODIUM, TAZOBACTAM SODIUM 3; .375 G/15ML; G/15ML
3.38 INJECTION, POWDER, LYOPHILIZED, FOR SOLUTION INTRAVENOUS EVERY 6 HOURS
Status: DISCONTINUED | OUTPATIENT
Start: 2024-03-15 | End: 2024-03-16 | Stop reason: HOSPADM

## 2024-03-15 RX ORDER — IOPAMIDOL 755 MG/ML
500 INJECTION, SOLUTION INTRAVASCULAR ONCE
Status: COMPLETED | OUTPATIENT
Start: 2024-03-15 | End: 2024-03-15

## 2024-03-15 RX ADMIN — PIPERACILLIN AND TAZOBACTAM 3.38 G: 3; .375 INJECTION, POWDER, FOR SOLUTION INTRAVENOUS at 19:29

## 2024-03-15 RX ADMIN — IOPAMIDOL 100 ML: 755 INJECTION, SOLUTION INTRAVENOUS at 12:26

## 2024-03-15 RX ADMIN — PIPERACILLIN AND TAZOBACTAM 3.38 G: 3; .375 INJECTION, POWDER, FOR SOLUTION INTRAVENOUS at 13:28

## 2024-03-15 RX ADMIN — SODIUM CHLORIDE 60 ML: 9 INJECTION, SOLUTION INTRAVENOUS at 12:26

## 2024-03-15 ASSESSMENT — ACTIVITIES OF DAILY LIVING (ADL)
ADLS_ACUITY_SCORE: 35
ADLS_ACUITY_SCORE: 33
ADLS_ACUITY_SCORE: 35

## 2024-03-15 ASSESSMENT — COLUMBIA-SUICIDE SEVERITY RATING SCALE - C-SSRS
1. IN THE PAST MONTH, HAVE YOU WISHED YOU WERE DEAD OR WISHED YOU COULD GO TO SLEEP AND NOT WAKE UP?: NO
2. HAVE YOU ACTUALLY HAD ANY THOUGHTS OF KILLING YOURSELF IN THE PAST MONTH?: NO
6. HAVE YOU EVER DONE ANYTHING, STARTED TO DO ANYTHING, OR PREPARED TO DO ANYTHING TO END YOUR LIFE?: NO

## 2024-03-15 NOTE — MEDICATION SCRIBE - ADMISSION MEDICATION HISTORY
Medication Scribe Admission Medication History    Admission medication history is complete. The information provided in this note is only as accurate as the sources available at the time of the update.    Information Source(s): Patient via in-person    Pertinent Information:     Changes made to PTA medication list:  Added: None  Deleted: IUD - discontinue 3/11/2024  Spacer - holding chamber - does not use   Spiriva - no longer using   Changed: None    Allergies reviewed with patient and updates made in EHR: yes    Medication History Completed By: TONNY NAIR 3/15/2024 4:56 PM    PTA Med List   Medication Sig Last Dose    albuterol (PROAIR HFA/PROVENTIL HFA/VENTOLIN HFA) 108 (90 Base) MCG/ACT inhaler Inhale 2 puffs into the lungs every 6 hours More than a month at prn    omeprazole (PRILOSEC) 20 MG DR capsule Take 20 mg by mouth daily 3/15/2024 at am

## 2024-03-15 NOTE — TELEPHONE ENCOUNTER
RN paged Dr. Leon and gave info below- she will contact Dr. Bloom.    Maya Han RN on 3/15/2024 at 4:45 PM

## 2024-03-15 NOTE — ED PROVIDER NOTES
[facility] ED Provider Note   Patient: Janeth Argueta  MRN #:  [id number]  Date of Visit: March 15, 2024    CC:     Chief Complaint   Patient presents with    Back Pain     HPI:  Janeth Argueta is a 38 year old female G2, P2 with recent IUD that was removed on Monday who presented to the emergency department the request of her OB/GYN and general surgeon.  There was concern for possible right ovarian abscess and acute diverticulitis based on MRI imaging from yesterday.  Patient states that she had a suspected pelvic infection approximately 2 to 3 weeks ago, patient had a pelvic ultrasound on February 26, with findings of indeterminate rounded isoechoic lesion within the right ovary, measuring 2.9 cm.  A repeat pelvic ultrasound as well as pelvic MRI was suggested for further evaluation.  Patient states that she started develop some diffuse abdominal and back pain along with temperature dysregulation 2 to 3 weeks ago at the onset of her symptoms.  She was feeling hot and cold even though her temperatures never revealed a fever.  When she started antibiotics, the pain seemed to localize to the right lower quadrant.  She completed 10-14 days of antibiotics as of Monday, and started to have some recurrent symptoms of the discomfort.  She rates her current pain level today 5 out of 10.  Pain is not associated with urination or bowel movements.  Her stools have been slightly looser than normal, but not diarrhea.  Her pain is not worsened by eating, drinking, or stooling.  She had the IUD removed on Monday.  She has been with the same partner for 14 years.  Patient denies any prior history of diverticulitis and has had no previous abdominal surgeries.    Problem List:  Patient Active Problem List    Diagnosis Date Noted    Severe obesity (BMI 35.0-39.9) with comorbidity (H) 06/21/2022     Priority: Medium    Chronic obstructive pulmonary disease,  unspecified COPD type (H) 2022     Priority: Medium    Acute gouty arthropathy 2014     Priority: Medium    Anxiety 2012     Priority: Medium    GERD (gastroesophageal reflux disease) 2011     Priority: Medium    CARDIOVASCULAR SCREENING; LDL GOAL LESS THAN 160 10/31/2010     Priority: Medium    Calculus of kidney 2007     Priority: Medium     At 24 weeks. Passed, right sided.          Past Medical History:   Diagnosis Date    Calculus of kidney ages 13 and 22    Tobacco abuse 2015       MEDS: albuterol (PROAIR HFA/PROVENTIL HFA/VENTOLIN HFA) 108 (90 Base) MCG/ACT inhaler  omeprazole (PRILOSEC) 20 MG DR capsule        ALLERGIES:  No Known Allergies    Past Surgical History:   Procedure Laterality Date     SECTION  2011    Procedure: SECTION; Surgeon:LACHO VALENTIN; Location: OR     TOOTH EXTRACTION W/FORCEP  age 17    wisdom tooth extraction    LITHOTRIPSY  2008    Left lithotripsy. [facility]    ORTHOPEDIC SURGERY      Z OPEN RX ELBOW DISLOCATION  high school    fracture right elbow, open fixation       Social History     Tobacco Use    Smoking status: Former     Packs/day: 0.50     Years: 5.00     Additional pack years: 0.00     Total pack years: 2.50     Types: Cigarettes     Quit date: 2020     Years since quittin.2    Smokeless tobacco: Never   Vaping Use    Vaping Use: Never used   Substance Use Topics    Alcohol use: No    Drug use: No     Comment: THC in the past         Review of Systems   Except as noted in HPI, all other systems were reviewed and are negative    Physical Exam   Vitals were reviewed  Patient Vitals for the past 12 hrs:   BP Temp Temp src Pulse Resp SpO2   03/15/24 1750 107/54 -- -- 57 18 96 %   03/15/24 1535 94/50 -- -- 58 18 96 %   03/15/24 1330 91/59 -- -- 56 -- 97 %   03/15/24 1119 124/70 97.7  F (36.5  C) Oral 73 20 99 %     GENERAL APPEARANCE: Alert, no acute distress  FACE: normal facies  EYES:  Pupils are equal; sclera is nonicteric  HENT: normal external exam  NECK: no adenopathy or asymmetry  RESP: normal respiratory effort; clear breath sounds bilaterally  CV: regular rate and rhythm; no significant murmurs, gallops or rubs  ABD: soft, minimal right lower quadrant tenderness; no rebound or guarding; bowel sounds are normal: No CVA tenderness  EXT: No calf tenderness or pitting edema  SKIN: no worrisome rash  NEURO: no facial droop; no focal deficits, speech is normal  PSYCH: normal mood and affect      Available Lab/Imaging Results     Results for orders placed or performed during the hospital encounter of 03/15/24 (from the past 24 hour(s))   CBC with platelets differential    Narrative    The following orders were created for panel order CBC with platelets differential.  Procedure                               Abnormality         Status                     ---------                               -----------         ------                     CBC with platelets and d...[[id number]]                      Final result                 Please view results for these tests on the individual orders.   Comprehensive metabolic panel   Result Value Ref Range    Sodium 137 135 - 145 mmol/L    Potassium 4.7 3.4 - 5.3 mmol/L    Carbon Dioxide (CO2) 23 22 - 29 mmol/L    Anion Gap 10 7 - 15 mmol/L    Urea Nitrogen 15.9 6.0 - 20.0 mg/dL    Creatinine 0.60 0.51 - 0.95 mg/dL    GFR Estimate >90 >60 mL/min/1.73m2    Calcium 9.2 8.6 - 10.0 mg/dL    Chloride 104 98 - 107 mmol/L    Glucose 106 (H) 70 - 99 mg/dL    Alkaline Phosphatase 59 40 - 150 U/L    AST 41 0 - 45 U/L    ALT      Protein Total 7.3 6.4 - 8.3 g/dL    Albumin 4.2 3.5 - 5.2 g/dL    Bilirubin Total 0.3 <=1.2 mg/dL   Lactic acid whole blood w/ reflex x1 in 3 Hr when >2   Result Value Ref Range    Lactic Acid, Initial 1.4 0.7 - 2.0 mmol/L   CBC with platelets and differential   Result Value Ref Range    WBC Count 6.5 4.0 - 11.0 10e3/uL    RBC Count 4.42 3.80 -  5.20 10e6/uL    Hemoglobin 14.2 11.7 - 15.7 g/dL    Hematocrit 41.4 35.0 - 47.0 %    MCV 94 78 - 100 fL    MCH 32.1 26.5 - 33.0 pg    MCHC 34.3 31.5 - 36.5 g/dL    RDW 12.8 10.0 - 15.0 %    Platelet Count 253 150 - 450 10e3/uL    % Neutrophils 46 %    % Lymphocytes 38 %    % Monocytes 9 %    % Eosinophils 6 %    % Basophils 1 %    % Immature Granulocytes 0 %    NRBCs per 100 WBC 0 <1 /100    Absolute Neutrophils 3.0 1.6 - 8.3 10e3/uL    Absolute Lymphocytes 2.5 0.8 - 5.3 10e3/uL    Absolute Monocytes 0.6 0.0 - 1.3 10e3/uL    Absolute Eosinophils 0.4 0.0 - 0.7 10e3/uL    Absolute Basophils 0.1 0.0 - 0.2 10e3/uL    Absolute Immature Granulocytes 0.0 <=0.4 10e3/uL    Absolute NRBCs 0.0 10e3/uL   CT ABDOMEN PELVIS W CONTRAST    Narrative    CT ABDOMEN AND PELVIS WITH CONTRAST 3/15/2024 12:36 PM    CLINICAL HISTORY: Abdominal pain. Suspected acute diverticulitis and  ovarian abscess.    TECHNIQUE: CT scan of the abdomen and pelvis was performed following  injection of IV contrast. Multiplanar reformats were obtained. Dose  reduction techniques were used.    CONTRAST: ISOVUE-370, 100 mL    COMPARISON: January 10, 2015 CT, March 14, 2024 MRI.    FINDINGS:   LOWER CHEST: No infiltrates or effusions.    HEPATOBILIARY: No significant mass or bile duct dilatation. No  calcified gallstones.     PANCREAS: No significant mass, duct dilatation, or inflammatory  change.    SPLEEN: Normal size.    ADRENAL GLANDS: No significant nodules.    KIDNEYS/BLADDER: No significant mass, stones, or hydronephrosis.    BOWEL: No obstruction or inflammatory change.    PELVIC ORGANS: Encapsulated fluid collection in the right ovary/adnexa  measuring 3.7 x 3.0 cm suspicious for abscess. This is immediately  adjacent to a thickened wall of colon as well, it is unclear if the  abscess is related to the fallopian tubes or diverticulitis.    ADDITIONAL FINDINGS: Small amount of free layering pelvic fluid. No  free air.    MUSCULOSKELETAL: No frankly  destructive bony lesions.      Impression    IMPRESSION:   3.7 x 3.0 cm suspected abscess between the sigmoid colon and the right  ovary/adnexa.    ELA ARROYO MD         SYSTEM ID:  [id number]   UA with Microscopic reflex to Culture    Specimen: Urine, Clean Catch   Result Value Ref Range    Color Urine Yellow Colorless, Straw, Light Yellow, Yellow    Appearance Urine Clear Clear    Glucose Urine Negative Negative mg/dL    Bilirubin Urine Negative Negative    Ketones Urine Negative Negative mg/dL    Specific Gravity Urine 1.010 1.003 - 1.035    Blood Urine Negative Negative    pH Urine 7.0 5.0 - 7.0    Protein Albumin Urine Negative Negative mg/dL    Urobilinogen Urine Normal Normal, 2.0 mg/dL    Nitrite Urine Negative Negative    Leukocyte Esterase Urine Negative Negative    Mucus Urine Present (A) None Seen /LPF    RBC Urine 1 <=2 /HPF    WBC Urine <1 <=5 /HPF    Squamous Epithelials Urine 1 <=1 /HPF    Narrative    Urine Culture not indicated   US Pelvic Complete with Transvaginal    Narrative    ULTRASOUND PELVIC TRANSABDOMINAL AND TRANSVAGINAL March 15, 2024 2:23  PM    CLINICAL HISTORY: Ovarian vs diverticular abscess.    TECHNIQUE: Transabdominal scans were performed. Endovaginal ultrasound  was performed to better visualize the adnexa.    COMPARISON: CT 3/15/2024.    FINDINGS:    UTERUS: 8.1 x 3.5 x 5.1 cm. Normal in size and position with no  masses.    ENDOMETRIUM: 7 mm. No focal abnormality.    RIGHT OVARY: 4.3 x 3.9 x 3.6 cm. Two adjacent complex cystic lesions  noted at the right ovary. These have internal debris and some  peripheral wall prominence. These measure 2.5 x 2.9 x 3.2 cm, and 1.4  x 1.8 x 1.4 cm. These correspond with the imaging findings at CT  performed earlier. Color and Doppler spectral assessment of the right  ovary shows arterial and venous waveforms.    LEFT OVARY: 2.6 x 1.5 x 1.2 cm. Normal with flow demonstrated.    No significant free fluid.      Impression    IMPRESSION: Two  adjacent complex cystic lesions that appear to  localize to the right ovary with internal debris and peripheral wall  thickening. These are suspicious for right ovarian/tubo-ovarian  abscesses.      JESSICA NAIR MD         SYSTEM ID:  [id number]            Impression     Final diagnoses:   Intra-abdominal abscess (H)   Right tubo-ovarian abscess         ED Course & Medical Decision Making   Janeth Argueta is a 38 year old female who presented to the emergency department with concern for possible acute diverticulitis with intra-abdominal/pelvic abscess.  This was noted on an MRI scan from yesterday showing mild thickened and enhancing sigmoid colon with mild surrounding fat stranding suspicious for acute sigmoid diverticulitis.  The thickened sigmoid colon abuts the right ovary and there are 2 adjacent rim-enhancing cystic lesions in the right ovary.  Right ovarian abscess related to adjacent acute diverticulitis is suspected.  Patient was referred to the ED for further evaluation by the general surgeon for whom she was referred to.    Vital signs reveal a temp of 97.7, blood pressure 124/70, heart rate of 73, respiratory of 20, oxygen saturation 99%.  On exam, patient has normal active bowel sounds.  There is very mild/minimal right lower quadrant tenderness.  No rebound or guarding.  Laboratory workup reveals a normal white blood count of 6.5, hemoglobin of 14.2, platelet count of 253 with normal differential.  Lactic acid levels 1.4.  Basic metabolic panel is normal.  Glucose is 106.    CT scan of the abdomen with contrast reveals a 3.7 x 3.0 cm suspected abscess between the sigmoid colon and the right ovary/adnexa.    1:44 PM: Spoke with Dr. Mathews, and given that this abscess is not amenable to percutaneous drainage, he felt that the patient could be admitted for IV antibiotics.  We will obtain a pelvic ultrasound to try to delineate ovarian abscess versus diverticular abscess.    3:01 PM: Pelvic ultrasound  reveals 2 adjacent complex cystic lesions noted at the right ovary.  These have internal debris and some peripheral wall prominence.  These correspond with the imaging findings at CT performed earlier.  These are suspicious for right ovarian/tubo-ovarian abscesses.    3:20 PM: I spoke with Dr. Mathews again.  Given that this is likely an ovarian abscess, we will talk to our OB providers here.  Patient was updated on her condition, ultrasound findings, and recommendations thus far.    4:10 PM: I spoke with Dr. Grady, and he raised concerns for possible need for laparoscopic surgery for her condition.  He recommended that we contact Dr. Gaudencio Brennan group who had been involved with her care.    5:10 PM: I spoke with Dr. Yudelka Leon.  She checked with the [facility] and there were no available beds at this time.  She recommended that the patient be admitted for IV antibiotics for 48 hours to see if the abscess improves.  I spoke with Dr. Grady, and he was willing to be involved with the medical management.      5:45 PM: I checked with Dr. Patel, our hospitalist to see if she was comfortable with managing this.  She will be in touch with Dr. Grady and work out an arrangement.    7:40 PM: I spoke with Dr. Camp, OB/GYN at [facility].  She has graciously accepted the patient at [facility].      8:10 PM: Patient was updated on her care plan.  She is in agreement with this plan.  She does not feel comfortable driving at night and she will contact her  to see if they felt comfortable with support by private vehicle or by ambulance.      Disclaimer: This note consists of words and symbols derived from keyboarding and dictation using voice recognition software.  As a result, there may be errors that have gone undetected.  Please consider this when interpreting information found in this note.       Prema Bloom MD  03/15/24 2015

## 2024-03-15 NOTE — ED TRIAGE NOTES
Was called by her surgeon at 0900 this morning to go to the ER. Comes in with lower back pain and right groin pain  has a cyst on her right ovary that is large and infected. Had a MRI yesterday.     Triage Assessment (Adult)       Row Name 03/15/24 1120          Triage Assessment    Airway WDL WDL        Respiratory WDL    Respiratory WDL WDL        Skin Circulation/Temperature WDL    Skin Circulation/Temperature WDL WDL        Cardiac WDL    Cardiac WDL WDL        Cognitive/Neuro/Behavioral WDL    Cognitive/Neuro/Behavioral WDL WDL

## 2024-03-15 NOTE — TELEPHONE ENCOUNTER
"Received paper referral from [facility], for \"surgical consultation for acute diverticulitis with right ovarian involvement, possible abscess.\"  This was reviewed by Dr Motley, he recommends patient be seen in ED due to more workup that would be needed. Called patient, informed her of message by Dr Motley. Patient states she is doing ok, has been on antibiotics. Informed her to closely watch symptoms, and to report to ED. Patient states understanding.  ACa/MA  [facility]    "

## 2024-03-15 NOTE — TELEPHONE ENCOUNTER
[facility] Call Center    Phone Message    May a detailed message be left on voicemail: yes     Reason for Call: Other: GRAEME Nielsen from [facility] ED called on behalf of provider Dr Bloom requesting to connect providers regarding pt. Rn tried paging provider via smart web unable to reach. Writer unable to get further instruction via secure chat. Please page on-call who is able to connect with Dr Bloom regarding pt when able. Best number: [phone]      Action Taken: Message routed to:  Other:  OBGYN    Travel Screening: Not Applicable

## 2024-03-15 NOTE — TELEPHONE ENCOUNTER
I called and spoke to Dr. Bloom.  Patient in the [place] ED with a TOA.  He states OB at [place] did not want to admit due to not being to operate (no gyn there).  We discussed possibly starting IV antibiotics there and admit.  Could possibly transfer if surgery was needed after IV antibiotics.  IV antibiotics were started already in the ED.  I agreed to admit and called and spoke to the House Supervisor.  She stated [facility] only had 5 beds available and 26 patients were still waiting to be seen in the ED including the 24 patients being seen in the ED.  She stated we did have the capability to admit now.  She recommended they try back in a few hours.  I called Dr. Bloom back and discussed this with him.  He will reach out to Dr. Aparicio.    I received a call back from Dr. Bloom stating Dr. Aparicio and the Hospitalist were not comfortable admitting this patient.  I recommended Dr. Bloom try [place].  I will also check again with the House Supervisor if needed.      Yudelka Leon,

## 2024-03-16 ENCOUNTER — HOSPITAL ENCOUNTER (INPATIENT)
Facility: CLINIC | Age: 39
LOS: 2 days | Discharge: HOME | DRG: 758 | End: 2024-03-18
Attending: OBSTETRICS & GYNECOLOGY | Admitting: OBSTETRICS & GYNECOLOGY
Payer: COMMERCIAL

## 2024-03-16 DIAGNOSIS — N70.93 TUBO-OVARIAN ABSCESS: Primary | ICD-10-CM

## 2024-03-16 LAB
ERYTHROCYTE [DISTWIDTH] IN BLOOD BY AUTOMATED COUNT: 12.7 % (ref 10–15)
HCT VFR BLD AUTO: 40.8 % (ref 35–47)
HGB BLD-MCNC: 13.6 G/DL (ref 11.7–15.7)
HOLD SPECIMEN: NORMAL
MCH RBC QN AUTO: 31.6 PG (ref 26.5–33)
MCHC RBC AUTO-ENTMCNC: 33.3 G/DL (ref 31.5–36.5)
MCV RBC AUTO: 95 FL (ref 78–100)
PLATELET # BLD AUTO: 224 10E3/UL (ref 150–450)
RBC # BLD AUTO: 4.3 10E6/UL (ref 3.8–5.2)
WBC # BLD AUTO: 7.8 10E3/UL (ref 4–11)

## 2024-03-16 PROCEDURE — 85027 COMPLETE CBC AUTOMATED: CPT

## 2024-03-16 PROCEDURE — 120N000002 HC R&B MED SURG/OB UMMC

## 2024-03-16 PROCEDURE — 36415 COLL VENOUS BLD VENIPUNCTURE: CPT

## 2024-03-16 PROCEDURE — 250N000011 HC RX IP 250 OP 636

## 2024-03-16 PROCEDURE — 250N000013 HC RX MED GY IP 250 OP 250 PS 637

## 2024-03-16 RX ORDER — NALOXONE HYDROCHLORIDE 0.4 MG/ML
0.4 INJECTION, SOLUTION INTRAMUSCULAR; INTRAVENOUS; SUBCUTANEOUS
Status: DISCONTINUED | OUTPATIENT
Start: 2024-03-16 | End: 2024-03-18 | Stop reason: HOSPADM

## 2024-03-16 RX ORDER — ACETAMINOPHEN 325 MG/1
650 TABLET ORAL EVERY 4 HOURS PRN
Status: DISCONTINUED | OUTPATIENT
Start: 2024-03-16 | End: 2024-03-18 | Stop reason: HOSPADM

## 2024-03-16 RX ORDER — LIDOCAINE 40 MG/G
CREAM TOPICAL
Status: DISCONTINUED | OUTPATIENT
Start: 2024-03-16 | End: 2024-03-18 | Stop reason: HOSPADM

## 2024-03-16 RX ORDER — ALBUTEROL SULFATE 90 UG/1
2 AEROSOL, METERED RESPIRATORY (INHALATION) EVERY 6 HOURS
Status: DISCONTINUED | OUTPATIENT
Start: 2024-03-16 | End: 2024-03-18 | Stop reason: HOSPADM

## 2024-03-16 RX ORDER — NALOXONE HYDROCHLORIDE 0.4 MG/ML
0.2 INJECTION, SOLUTION INTRAMUSCULAR; INTRAVENOUS; SUBCUTANEOUS
Status: DISCONTINUED | OUTPATIENT
Start: 2024-03-16 | End: 2024-03-18 | Stop reason: HOSPADM

## 2024-03-16 RX ORDER — ACETAMINOPHEN 650 MG/1
650 SUPPOSITORY RECTAL EVERY 4 HOURS PRN
Status: DISCONTINUED | OUTPATIENT
Start: 2024-03-16 | End: 2024-03-18 | Stop reason: HOSPADM

## 2024-03-16 RX ORDER — OXYCODONE HYDROCHLORIDE 5 MG/1
5 TABLET ORAL EVERY 6 HOURS PRN
Status: DISCONTINUED | OUTPATIENT
Start: 2024-03-16 | End: 2024-03-18 | Stop reason: HOSPADM

## 2024-03-16 RX ORDER — ONDANSETRON 2 MG/ML
4 INJECTION INTRAMUSCULAR; INTRAVENOUS EVERY 6 HOURS PRN
Status: DISCONTINUED | OUTPATIENT
Start: 2024-03-16 | End: 2024-03-18 | Stop reason: HOSPADM

## 2024-03-16 RX ORDER — PIPERACILLIN SODIUM, TAZOBACTAM SODIUM 3; .375 G/15ML; G/15ML
3.38 INJECTION, POWDER, LYOPHILIZED, FOR SOLUTION INTRAVENOUS EVERY 6 HOURS
Status: COMPLETED | OUTPATIENT
Start: 2024-03-16 | End: 2024-03-17

## 2024-03-16 RX ORDER — AMOXICILLIN 250 MG
1 CAPSULE ORAL 2 TIMES DAILY PRN
Status: DISCONTINUED | OUTPATIENT
Start: 2024-03-16 | End: 2024-03-18 | Stop reason: HOSPADM

## 2024-03-16 RX ORDER — CALCIUM CARBONATE 500 MG/1
1000 TABLET, CHEWABLE ORAL 4 TIMES DAILY PRN
Status: DISCONTINUED | OUTPATIENT
Start: 2024-03-16 | End: 2024-03-18 | Stop reason: HOSPADM

## 2024-03-16 RX ORDER — POLYETHYLENE GLYCOL 3350 17 G/17G
17 POWDER, FOR SOLUTION ORAL 2 TIMES DAILY PRN
Status: DISCONTINUED | OUTPATIENT
Start: 2024-03-16 | End: 2024-03-18 | Stop reason: HOSPADM

## 2024-03-16 RX ORDER — IBUPROFEN 600 MG/1
600 TABLET, FILM COATED ORAL EVERY 6 HOURS PRN
Status: DISCONTINUED | OUTPATIENT
Start: 2024-03-16 | End: 2024-03-18 | Stop reason: HOSPADM

## 2024-03-16 RX ORDER — ONDANSETRON 4 MG/1
4 TABLET, ORALLY DISINTEGRATING ORAL EVERY 6 HOURS PRN
Status: DISCONTINUED | OUTPATIENT
Start: 2024-03-16 | End: 2024-03-18 | Stop reason: HOSPADM

## 2024-03-16 RX ORDER — PROCHLORPERAZINE 25 MG
25 SUPPOSITORY, RECTAL RECTAL EVERY 12 HOURS PRN
Status: DISCONTINUED | OUTPATIENT
Start: 2024-03-16 | End: 2024-03-18 | Stop reason: HOSPADM

## 2024-03-16 RX ORDER — AMOXICILLIN 250 MG
2 CAPSULE ORAL 2 TIMES DAILY PRN
Status: DISCONTINUED | OUTPATIENT
Start: 2024-03-16 | End: 2024-03-18 | Stop reason: HOSPADM

## 2024-03-16 RX ORDER — PROCHLORPERAZINE MALEATE 10 MG
10 TABLET ORAL EVERY 6 HOURS PRN
Status: DISCONTINUED | OUTPATIENT
Start: 2024-03-16 | End: 2024-03-18 | Stop reason: HOSPADM

## 2024-03-16 RX ADMIN — PIPERACILLIN AND TAZOBACTAM 3.38 G: 3; .375 INJECTION, POWDER, FOR SOLUTION INTRAVENOUS at 20:50

## 2024-03-16 RX ADMIN — PIPERACILLIN AND TAZOBACTAM 3.38 G: 3; .375 INJECTION, POWDER, FOR SOLUTION INTRAVENOUS at 09:03

## 2024-03-16 RX ADMIN — PIPERACILLIN AND TAZOBACTAM 3.38 G: 3; .375 INJECTION, POWDER, FOR SOLUTION INTRAVENOUS at 03:17

## 2024-03-16 RX ADMIN — OXYCODONE HYDROCHLORIDE 5 MG: 5 TABLET ORAL at 21:27

## 2024-03-16 RX ADMIN — ACETAMINOPHEN 650 MG: 325 TABLET, FILM COATED ORAL at 21:27

## 2024-03-16 RX ADMIN — OMEPRAZOLE 20 MG: 20 CAPSULE, DELAYED RELEASE ORAL at 09:03

## 2024-03-16 RX ADMIN — PIPERACILLIN AND TAZOBACTAM 3.38 G: 3; .375 INJECTION, POWDER, FOR SOLUTION INTRAVENOUS at 15:38

## 2024-03-16 RX ADMIN — ACETAMINOPHEN 650 MG: 325 TABLET, FILM COATED ORAL at 06:37

## 2024-03-16 ASSESSMENT — ACTIVITIES OF DAILY LIVING (ADL)
ADLS_ACUITY_SCORE: 31

## 2024-03-16 NOTE — PLAN OF CARE
Goal Outcome Evaluation:      Plan of Care Reviewed With: patient    Overall Patient Progress: improvingOverall Patient Progress: improving    Outcome Evaluation: Pt alert and oriented x4, denied pain, here for abx x 48 hrs for possible tubo-ovarian cyst, up independently in room, on IV abx, SL in between doses, calls appropriately, PIV right arm. Voids ok, last BM yesterday, able to make needs known, continue POC

## 2024-03-16 NOTE — DISCHARGE SUMMARY
[facility]  Gynecology Discharge Summary    Admission Date:  24  Discharge Date:  3/18/24    Admission Attending: Shae Camp MD  Discharge Attending: Joceline Aranda MD     Admission Diagnosis:  - Suspected Tubo-ovarian abscess vs diverticulitis   - COPD  - Asthma  - GERD    Discharge Diagnosis:  - Same    Procedures Performed:  - IV antibiotics    Admission History:  Janeth Argueta is a 38 year old  who presents as a transfer of care from an outside hospital for higher level of gynecologic care. Briefly, patient had a recent pelvic infection for which she underwent an US on  that showed an indeterminate rounded mass in right ovary. She underwent 14 days of PO antibiotics (doxycycline/metronidazole) and on 3/11, patient had an uncomplicated IUD removal, as the IUD had .  Over the last few days she developed abdominal pain and underwent MRI on 3/14 which revealed abdominal mass concerning for right TOA versus acute sigmoid diverticulitis.  She presented to the emergency room at an outside hospital and underwent CT abdomen and pelvis and pelvic ultrasound, both of which showed approximately 3 x 3 cm fluid collection suspicious for ovarian abscess immediately adjacent to the colon.     Hospital Course:  She was admitted and continued on IV piperacillin-tazobactam. IR was consulted and felt there was not a safe window through which to drain the abscess. She was transitioned to oral antibiotics on HD#2. On HD#3 her pain had improved. She remained afebrile throughout her admission. Her pain improved. She did have episodes of diarrhea, and tested negative for c.diff. She was started on probiotics with improvement. She was stable for discharge to home.     Discharge Plans:  - The patient was discharged to home  - PO Activity:   - No driving while taking narcotics or until you can slam on the breaks without pain  - She was instructed to call  or return to ED if she has  any of the following:    - Temperature greater than 100.4F   - Pain not controlled by pain medications   - Uncontrolled nausea/vomitin    - Discharge medications include:     Review of your medicines        START taking        Dose / Directions   acetaminophen 325 MG tablet  Commonly known as: TYLENOL      Dose: 650 mg  Take 2 tablets (650 mg) by mouth every 6 hours as needed for mild pain  Quantity: 24 tablet  Refills: 0     amoxicillin-clavulanate 875-125 MG tablet  Commonly known as: AUGMENTIN  Indication: Infection Within the Abdomen      Dose: 1 tablet  Take 1 tablet by mouth 3 times daily  Quantity: 32 tablet  Refills: 0     cyclobenzaprine 5 MG tablet  Commonly known as: FLEXERIL      Dose: 5 mg  Take 1 tablet (5 mg) by mouth every 8 hours as needed for muscle spasms  Quantity: 12 tablet  Refills: 0     ibuprofen 600 MG tablet  Commonly known as: ADVIL/MOTRIN      Dose: 600 mg  Take 1 tablet (600 mg) by mouth every 6 hours as needed for inflammatory pain  Quantity: 24 tablet  Refills: 0     lactobacillus rhamnosus (GG) capsule      Dose: 1 capsule  Take 1 capsule by mouth 2 times daily  Refills: 0            CONTINUE these medicines which have NOT CHANGED        Dose / Directions   albuterol 108 (90 Base) MCG/ACT inhaler  Commonly known as: PROAIR HFA/PROVENTIL HFA/VENTOLIN HFA  Used for: Chronic obstructive pulmonary disease, unspecified COPD type (H)      Dose: 2 puff  Inhale 2 puffs into the lungs every 6 hours  Quantity: 18 g  Refills: 4     omeprazole 20 MG DR capsule  Commonly known as: PriLOSEC      Dose: 20 mg  Take 20 mg by mouth daily  Refills: 0               Where to get your medicines        These medications were sent to [place], [place] - [address]  [address] [address]      Phone: [phone]   acetaminophen 325 MG tablet  amoxicillin-clavulanate 875-125 MG tablet  cyclobenzaprine 5 MG tablet  ibuprofen 600 MG tablet       Some of  these will need a paper prescription and others can be bought over the counter. Ask your nurse if you have questions.    You don't need a prescription for these medications  lactobacillus rhamnosus (GG) capsule           Jasmina Quiroz MD   OBGYN resident PGY3  03/18/2024 7:32 AM       Appreciate note by Dr. Lr. Patient has been seen and examined by me separate from the resident, agree with above note.     Joceline Aranda MD  10:26 AM

## 2024-03-16 NOTE — PROGRESS NOTES
[id number] Shift Summary:     No acute changes this shift.   IV abx given. Will need IV abx x48 hours for TOA - GYN following.   No pain   Patient took shower this shift.   R PIV in AC - SL   Alert and oriented x4  On RA  Independent in room.   Calls appropriately   Continue POC

## 2024-03-16 NOTE — PLAN OF CARE
End of Shift Summary: See VS and assessment details in flowsheets.    Pt was safely admitted to the unit around 0010 with personal belongings.  Admission vitals and assessment completed, bill of rights explained.  A&Ox4, calm and cooperative, able to make needs known with call light in reach.  Independent in room, pt had steady gait.  VSS, sats WNL on room air, continent, LBM  Pain to R flank rated 5/10 by pt, managed with tylenol.  2 RN skin assessment completed with Pao BEDOLLA, no observed skin concerns.  RPIV saline locked between abx.  Sleep and hydration promoted.  Continue POC.

## 2024-03-16 NOTE — H&P
Gynecology H&P    HPI:   Janeth Argueta is a 38 year old  who presents as a transfer of care from an outside hospital for higher level of gynecologic care. Briefly, patient had a recent pelvic infection for which she underwent an US on  that showed an indeterminate rounded mass in right ovary. She underwent 14 days of PO antibiotics (doxycycline/metronidazole) and on 3/11, patient had an uncomplicated IUD removal, as the IUD had .  Over the last few days she developed abdominal pain and underwent MRI on 3/14 which revealed abdominal mass concerning for right TOA versus acute sigmoid diverticulitis.  She presented to the emergency room at an outside hospital and underwent CT abdomen and pelvis and pelvic ultrasound, both of which showed approximately 3 x 3 cm fluid collection suspicious for ovarian abscess immediately adjacent to the colon.     Here she reports feeling about the same as when she came into the ER. Denies fevers, chills, chest pain, shortness of breath, worsening abdominal pain.    ROS:   Negative except as per HPI    OB History:   , Hx CS x2    GYN History:   S/p IUD removal  No history of abnormal pap smears    PMH:   Past Medical History:   Diagnosis Date    Calculus of kidney ages 13 and 22    Tobacco abuse 2015       PSHx:   Past Surgical History:   Procedure Laterality Date     SECTION  2011    Procedure: SECTION; Surgeon:LACHO VALENTIN; Location: OR     TOOTH EXTRACTION W/FORCEP  age 17    wisdom tooth extraction    LITHOTRIPSY  2008    Left lithotripsy. [facility]    ORTHOPEDIC SURGERY      ZZC OPEN RX ELBOW DISLOCATION  high school    fracture right elbow, open fixation       Medications:  Current Outpatient Medications   Medication Instructions    albuterol (PROAIR HFA/PROVENTIL HFA/VENTOLIN HFA) 108 (90 Base) MCG/ACT inhaler 2 puffs, Inhalation, EVERY 6 HOURS    omeprazole (PRILOSEC) 20 mg, Oral, DAILY        Allergies:    No Known Allergies    Social History:   Social History     Tobacco Use    Smoking status: Former     Packs/day: 0.50     Years: 5.00     Additional pack years: 0.00     Total pack years: 2.50     Types: Cigarettes     Quit date: 2020     Years since quittin.2    Smokeless tobacco: Never   Vaping Use    Vaping Use: Never used   Substance Use Topics    Alcohol use: No    Drug use: No     Comment: THC in the past       Physical Exam:   Patient Vitals for the past 24 hrs:   BP Temp Temp src Pulse Resp   24 0019 123/73 98  F (36.7  C) Oral 65 18     Gen:  Resting comfortably, NAD  CV:  Well perfused  Pulm:  NWOB, CTAB  Abd:  Soft, moderately tender to palpation in RLQ, non-distended, BS+  Ext:  Non-tender, no LE edema b/l  Gyn: Deferred    Labs:   Latest Reference Range & Units 03/15/24 12:11   Sodium 135 - 145 mmol/L 137   Potassium 3.4 - 5.3 mmol/L 4.7   Chloride 98 - 107 mmol/L 104   Carbon Dioxide (CO2) 22 - 29 mmol/L 23   Urea Nitrogen 6.0 - 20.0 mg/dL 15.9   Creatinine 0.51 - 0.95 mg/dL 0.60   GFR Estimate >60 mL/min/1.73m2 >90   Calcium 8.6 - 10.0 mg/dL 9.2   Anion Gap 7 - 15 mmol/L 10   Albumin 3.5 - 5.2 g/dL 4.2   Protein Total 6.4 - 8.3 g/dL 7.3   Alkaline Phosphatase 40 - 150 U/L 59   ALT  See Comment   AST 0 - 45 U/L 41   Bilirubin Total <=1.2 mg/dL 0.3   Glucose 70 - 99 mg/dL 106 (H)   Lactic Acid 0.7 - 2.0 mmol/L 1.4   WBC 4.0 - 11.0 10e3/uL 6.5   Hemoglobin 11.7 - 15.7 g/dL 14.2   Hematocrit 35.0 - 47.0 % 41.4   Platelet Count 150 - 450 10e3/uL 253   RBC Count 3.80 - 5.20 10e6/uL 4.42   MCV 78 - 100 fL 94   MCH 26.5 - 33.0 pg 32.1   MCHC 31.5 - 36.5 g/dL 34.3   RDW 10.0 - 15.0 % 12.8   (H): Data is abnormally high       Latest Reference Range & Units 03/15/24 14:18   Color Urine Colorless, Straw, Light Yellow, Yellow  Yellow   Appearance Urine Clear  Clear   Glucose Urine Negative mg/dL Negative   Bilirubin Urine Negative  Negative   Ketones Urine Negative mg/dL Negative    Specific Gravity Urine 1.003 - 1.035  1.010   pH Urine 5.0 - 7.0  7.0   Protein Albumin Urine Negative mg/dL Negative   Urobilinogen mg/dL Normal, 2.0 mg/dL Normal   Nitrite Urine Negative  Negative   Blood Urine Negative  Negative   Leukocyte Esterase Urine Negative  Negative   WBC Urine <=5 /HPF <1   RBC Urine <=2 /HPF 1   Squamous Epithelial /HPF Urine <=1 /HPF 1   Mucus Urine None Seen /LPF Present !   !: Data is abnormal    Imaging  Pelvic MRI (3/14/24)  IMPRESSION:  1.  Mildly thickened and enhancing sigmoid colon with mild surrounding fat stranding is suspicious for acute sigmoid diverticulitis.  2.  The thickened sigmoid colon abuts the right ovary, and there are two adjacent rim-enhancing cystic lesions in the right ovary. Right ovarian abscess related to adjacent acute diverticulitis is suspected.  3.  Contrast enhanced CT could be considered for further characterization.    CT Abdomen Pelvis (3/15/24)                                                   IMPRESSION:   3.7 x 3.0 cm suspected abscess between the sigmoid colon and the right ovary/adnexa.    Pelvic US (3/15/24)  FINDINGS:                                                   IMPRESSION: Two adjacent complex cystic lesions that appear to localize to the right ovary with internal debris and peripheral wall thickening. These are suspicious for right ovarian/tubo-ovarian abscesses.      A&P:   Janeth Argueta is a 38 year old  transfer of care now admitted HD#1 for management of suspected TOA. Vital signs stable, afebrile. Exam notable for right lower quadrant tenderness without guarding. Discussed imaging with IR team who feel that there is not currently a safe window through which to drain the abscess, nor is the abscess large enough that it necessitates drainage. Plan for admission for IV antibiotics and pain management.    #Suspected TOA vs diverticulitis  - Continue IV pip-tazo q 6hrs  - AM CBC  - Pain management: tylenol, ibuprofen, prn  "oxycodone 5 mg q 6hrs  - Regular diet    #COPD  - No PTA medications    Asthma  - Continue PTA albuterol    #GERD  - Continue PTA omeprazole      Patient seen and plan of care discussed with Dr. Camp.    Leticia Ramirez MD  Ob/Gyn Resident, PGY-3  03/16/2024 2:37 AM    I appreciate the note by Dr. Ramirez above.  Patient presented 2/26/24 with subjective fever/chills, but never had a fever to her outpatient clinic. She was started on a 2 week course of doxy/flagyl and referred for IUD removal.  Paragard IUD was removed on 3/11/24 (end of her 2 week oral abx course).  She had recurrence of pelvic pressure/pain on 3/14/24 and she was referred for a MRI and then to general surgery clinic for \"possible acute diverticulitis with right ovarian involvement\".  She was then told to present to the emergency department which she did yesterday.    She has not had gonorrhea or chlamydia testing but has received 2 week PID treatment as an outpatient.  Her blood cultures remain negative at 12 hours incubation.  On exam she has minimal tenderness.  All imaging reviewed.  Will plan to continue IV to complete 48 hours of therapy and then transition to oral therapy, likely augmentin to cover for diverticulitis treatment as she has already completed 2 week course of doxy/flagyl.    Shae Camp MD   "

## 2024-03-17 LAB — C DIFF TOX B STL QL: NEGATIVE

## 2024-03-17 PROCEDURE — 250N000013 HC RX MED GY IP 250 OP 250 PS 637: Performed by: OBSTETRICS & GYNECOLOGY

## 2024-03-17 PROCEDURE — 250N000013 HC RX MED GY IP 250 OP 250 PS 637

## 2024-03-17 PROCEDURE — 250N000011 HC RX IP 250 OP 636

## 2024-03-17 PROCEDURE — 87493 C DIFF AMPLIFIED PROBE: CPT | Performed by: OBSTETRICS & GYNECOLOGY

## 2024-03-17 PROCEDURE — 120N000002 HC R&B MED SURG/OB UMMC

## 2024-03-17 RX ORDER — CYCLOBENZAPRINE HCL 5 MG
5 TABLET ORAL EVERY 8 HOURS PRN
Qty: 12 TABLET | Refills: 0 | Status: SHIPPED | OUTPATIENT
Start: 2024-03-17 | End: 2024-04-08

## 2024-03-17 RX ORDER — LACTOBACILLUS RHAMNOSUS GG 10B CELL
1 CAPSULE ORAL 2 TIMES DAILY
Status: DISCONTINUED | OUTPATIENT
Start: 2024-03-17 | End: 2024-03-18 | Stop reason: HOSPADM

## 2024-03-17 RX ORDER — ACETAMINOPHEN 325 MG/1
650 TABLET ORAL EVERY 6 HOURS PRN
Qty: 24 TABLET | Refills: 0 | Status: SHIPPED | OUTPATIENT
Start: 2024-03-17

## 2024-03-17 RX ORDER — IBUPROFEN 600 MG/1
600 TABLET, FILM COATED ORAL EVERY 6 HOURS PRN
Qty: 24 TABLET | Refills: 0 | Status: SHIPPED | OUTPATIENT
Start: 2024-03-17

## 2024-03-17 RX ORDER — LACTOBACILLUS RHAMNOSUS GG 10B CELL
1 CAPSULE ORAL 2 TIMES DAILY
COMMUNITY
Start: 2024-03-18 | End: 2024-04-08

## 2024-03-17 RX ORDER — CYCLOBENZAPRINE HCL 5 MG
5 TABLET ORAL EVERY 8 HOURS PRN
Status: DISCONTINUED | OUTPATIENT
Start: 2024-03-17 | End: 2024-03-18 | Stop reason: HOSPADM

## 2024-03-17 RX ADMIN — PIPERACILLIN AND TAZOBACTAM 3.38 G: 3; .375 INJECTION, POWDER, FOR SOLUTION INTRAVENOUS at 09:57

## 2024-03-17 RX ADMIN — AMOXICILLIN AND CLAVULANATE POTASSIUM 1 TABLET: 875; 125 TABLET, FILM COATED ORAL at 20:07

## 2024-03-17 RX ADMIN — Medication 1 CAPSULE: at 12:26

## 2024-03-17 RX ADMIN — PIPERACILLIN AND TAZOBACTAM 3.38 G: 3; .375 INJECTION, POWDER, FOR SOLUTION INTRAVENOUS at 03:11

## 2024-03-17 RX ADMIN — Medication 1 CAPSULE: at 20:07

## 2024-03-17 RX ADMIN — AMOXICILLIN AND CLAVULANATE POTASSIUM 1 TABLET: 875; 125 TABLET, FILM COATED ORAL at 12:13

## 2024-03-17 RX ADMIN — CYCLOBENZAPRINE 5 MG: 5 TABLET, FILM COATED ORAL at 20:11

## 2024-03-17 RX ADMIN — ACETAMINOPHEN 650 MG: 325 TABLET, FILM COATED ORAL at 20:11

## 2024-03-17 RX ADMIN — CYCLOBENZAPRINE 5 MG: 5 TABLET, FILM COATED ORAL at 12:13

## 2024-03-17 ASSESSMENT — ACTIVITIES OF DAILY LIVING (ADL)
ADLS_ACUITY_SCORE: 20
ADLS_ACUITY_SCORE: 31
ADLS_ACUITY_SCORE: 20
ADLS_ACUITY_SCORE: 31
ADLS_ACUITY_SCORE: 20
ADLS_ACUITY_SCORE: 20
CONCENTRATING,_REMEMBERING_OR_MAKING_DECISIONS_DIFFICULTY: NO
FALL_HISTORY_WITHIN_LAST_SIX_MONTHS: NO
DOING_ERRANDS_INDEPENDENTLY_DIFFICULTY: NO
CHANGE_IN_FUNCTIONAL_STATUS_SINCE_ONSET_OF_CURRENT_ILLNESS/INJURY: NO
ADLS_ACUITY_SCORE: 20
ADLS_ACUITY_SCORE: 20
WEAR_GLASSES_OR_BLIND: YES
ADLS_ACUITY_SCORE: 31
ADLS_ACUITY_SCORE: 31
VISION_MANAGEMENT: CORRECTIVE
WALKING_OR_CLIMBING_STAIRS_DIFFICULTY: NO
DIFFICULTY_EATING/SWALLOWING: NO
ADLS_ACUITY_SCORE: 20
ADLS_ACUITY_SCORE: 31
ADLS_ACUITY_SCORE: 20
ADLS_ACUITY_SCORE: 31
TOILETING_ISSUES: NO
DRESSING/BATHING_DIFFICULTY: NO
ADLS_ACUITY_SCORE: 31
ADLS_ACUITY_SCORE: 20
ADLS_ACUITY_SCORE: 20

## 2024-03-17 NOTE — PROGRESS NOTES
No acute changes this shift.  Right PIV-Administered Zosyn-SL.  Patient reported 3/10 pain.  Vitals stable.  Bowel movement-sent to lab assessing for C. Difficile.  Regular diet.  Patient independent.  Patient rested most of shift.

## 2024-03-17 NOTE — PLAN OF CARE
Goal Outcome Evaluation:  Reports pain on R pelvic side as tolerable.Declined any pain med offered at this time.  Up ad poncho in room.  Denies problems voiding.LBM yesterday.  PIV line R AC,saline locked after IV abx.

## 2024-03-17 NOTE — PROGRESS NOTES
[id number] Shift Summary.    No acute events or changes this shift.   IV abx given, stable vitals, pain rated 5/10 and managed with PRNs Oxycodone and Tylenol. Pt denied SOB, Patient was pleasant, IND and ambulates in room. Patient progressing well.  POC is ongoing.

## 2024-03-17 NOTE — PROGRESS NOTES
[facility]  Gynecology Progress Note      Subjective:  Patient is resting comfortably in bed.  Complains of having a small amount of back pain.  She otherwise denies having any pain at all.  Was able to sleep well through the night.  Eating and drinking without nausea or vomiting.  Voiding spontaneously, no bowel or bladder symptoms.  Tolerating antibiotics without issue.  Has questions about plan/discharge home.  No other complaints this morning.     Objective:  Patient Vitals for the past 24 hrs:   BP Temp Temp src Pulse Resp SpO2   03/17/24 0729 124/63 97.7  F (36.5  C) Oral 59 16 98 %   03/16/24 2247 104/59 98.6  F (37  C) Oral 56 -- 97 %   03/16/24 1529 109/59 98.3  F (36.8  C) Oral 51 18 97 %     Gen: Resting comfortably, NAD  CV: Regular rate. Appears well-perfused  Pulm: Nonlabored breathing on room air  Abd: Soft, non tender to palpation throughout all quadrants, non-distended      A/P:  Janeth Argueta is a 38 year old on HD#2 admitted for management of suspected TOA versus diverticulitis.  Patient currently on IV antibiotics.  Has remained afebrile and hemodynamically stable.  Previously evaluated by IR team, no plans for attempted drainage at this time with plans to attempt to manage symptoms with antibiotics.No abdominal tenderness on exam this morning.     # Suspected TOA vs diverticulitis  -Plan to continue Zosyn for total of 48 hours (last dose at 9 AM this morning)  -Will then plan to transition to PO Augmentin 875 TID for 12 days to cover possible diverticulitis.  -WBC 7.8 yesterday. No indication for repeat CBC this morning  - Continue tylenol, ibuprofen, and oxycodone prn for pain management   - Regular diet    # COPD  - stable. Not PTA medications    # Asthma  - PTA albuterol ordered     # GERD  - PTA omeprazole ordered     Dispo: Discharge home tomorrow    Myrna Jackson DO, MS  Obstetrics, Gynecology & Women's Health   Resident, PGY-3  03/17/2024 6:33  AM      Appreciate note by Dr. Jackson. Patient has been seen and examined by me separate from the resident, agree with above note. Having increase of watery diarrhea, progressively becoming more watery. Some low back pain and cramping, feels similar to menstrual cramps. Denies fever or chills. Plan to evaluate for cdiff and add probiotic as treated >2wks with antibiotics. Transition to oral augmentin today and plan for discharge home tomorrow. Flexril prn for back pain.     Joceline Aranda MD  11:37 AM

## 2024-03-17 NOTE — PROGRESS NOTES
1419-3603 Shift Summary:     No acute changes this shift.   IV abx given - last dose this AM at 0900 - changed to PO abx this afternoon for 12 days.    Having loose stools - Cdiff negative.   Probiotic started.   Having minimal back pain - flexeril given x1  Has oxy, tylenol, ibuprofen available - did not give today.   R PIV in AC - SL   Alert and oriented x4  On RA  Independent in room.   Calls appropriately   Anticipate discharge tomorrow.    [phone]

## 2024-03-18 VITALS
HEART RATE: 57 BPM | RESPIRATION RATE: 16 BRPM | OXYGEN SATURATION: 99 % | TEMPERATURE: 97.7 F | SYSTOLIC BLOOD PRESSURE: 117 MMHG | DIASTOLIC BLOOD PRESSURE: 70 MMHG

## 2024-03-18 PROCEDURE — 250N000013 HC RX MED GY IP 250 OP 250 PS 637: Performed by: OBSTETRICS & GYNECOLOGY

## 2024-03-18 PROCEDURE — 250N000013 HC RX MED GY IP 250 OP 250 PS 637

## 2024-03-18 RX ADMIN — Medication 1 CAPSULE: at 09:50

## 2024-03-18 RX ADMIN — AMOXICILLIN AND CLAVULANATE POTASSIUM 1 TABLET: 875; 125 TABLET, FILM COATED ORAL at 08:04

## 2024-03-18 RX ADMIN — OMEPRAZOLE 20 MG: 20 CAPSULE, DELAYED RELEASE ORAL at 08:04

## 2024-03-18 ASSESSMENT — ACTIVITIES OF DAILY LIVING (ADL)
ADLS_ACUITY_SCORE: 22
ADLS_ACUITY_SCORE: 20
ADLS_ACUITY_SCORE: 22
ADLS_ACUITY_SCORE: 20
ADLS_ACUITY_SCORE: 20

## 2024-03-18 NOTE — PLAN OF CARE
/70 (BP Location: Left arm, Patient Position: Semi-Núñez's, Cuff Size: Adult Regular)   Pulse 57   Temp 97.8  F (36.6  C) (Oral)   Resp 16   LMP 06/15/2023 (Approximate)   SpO2 99%    [id number]  No acute events overnight     C/o spasms and ,mild back pain. Tylenol and flexeril administered  RA. VSS. A.O x 4  IND in room. R PIV saline locked   Per OB ,if no fever by noon -ok to discharge.

## 2024-03-18 NOTE — PROGRESS NOTES
[facility]  Gynecology Progress Note      Subjective:  Patient is resting comfortably in bed.  Denies pain. Drinking without nausea or vomiting.  Voiding spontaneously, no bowel or bladder symptoms.  Tolerating antibiotics without issue.  No concerns.     Objective:  Patient Vitals for the past 24 hrs:   BP Temp Temp src Pulse Resp SpO2   03/18/24 0622 117/70 97.8  F (36.6  C) Oral 57 16 99 %   03/17/24 1524 112/55 98.3  F (36.8  C) Oral 69 16 97 %     Gen: Resting comfortably, NAD  CV: Regular rate. Appears well-perfused  Pulm: Nonlabored breathing on room air  Abd: Soft, non tender to palpation throughout all quadrants, non-distended      A/P:  Janeth Argueta is a 38 year old on HD#3 admitted for management of suspected TOA versus diverticulitis.  Transitioned to PO antibiotics yesterday evening- will have been on 24 hours of PO antibiotics at noon today.  Has remained afebrile and hemodynamically stable.  Previously evaluated by IR team, no plans for attempted drainage at this time with plans to attempt to manage symptoms with antibiotics. No abdominal tenderness on exam this morning. Clinically improved. Will monitor this morning and discharge midday.     # Suspected TOA vs diverticulitis  - S/p 48 hrs Zosyn; D#2/12 PO Augmentin (to cover for possible diverticulitis); will be 24 hours on PO antibiotics at noon today.   - Continue tylenol, ibuprofen, and oxycodone prn for pain management   - Regular diet  - Has been afebrile with normal white count     #Back pain, improved   - Flexeril PRN     #Diarrhea  Diarrhea yesterday with improvement yesterday evening and overnight   - C. Diff negative  - Probiotic BID     # COPD  - stable. Not PTA medications    # Asthma  - PTA albuterol ordered     # GERD  - PTA omeprazole ordered     Dispo: Discharge home today.     Jasmina Quiroz MD   OBGYN resident PGY3  03/18/2024 6:24 AM      Appreciate note by Dr. Lr. Patient has been seen  and examined by me separate from the resident, agree with above note. Stable for discharge, will follow up with primary clinic.     Joceline Aranda MD  10:25 AM

## 2024-03-18 NOTE — PLAN OF CARE
Goal Outcome Evaluation:         Pt A&Ox4. Denies chest pain, SOB, N/V, N/T. Pt denies pain, no pain meds given. No temp. Oral ABX given. Pt cleared for discharge. Went over paperwork, answered questions, discharge meds given to pt. Pt left unit with .

## 2024-03-20 LAB
BACTERIA BLD CULT: NO GROWTH
BACTERIA BLD CULT: NO GROWTH

## 2024-03-26 ENCOUNTER — MEDICAL CORRESPONDENCE (OUTPATIENT)
Dept: SCHEDULING | Facility: CLINIC | Age: 39
End: 2024-03-26
Payer: COMMERCIAL

## 2024-03-29 ENCOUNTER — HOSPITAL ENCOUNTER (OUTPATIENT)
Dept: ULTRASOUND IMAGING | Facility: CLINIC | Age: 39
Discharge: HOME | End: 2024-03-29
Attending: NURSE PRACTITIONER | Admitting: NURSE PRACTITIONER
Payer: COMMERCIAL

## 2024-03-29 DIAGNOSIS — N70.92 OVARIAN ABSCESS: ICD-10-CM

## 2024-03-29 PROCEDURE — 76830 TRANSVAGINAL US NON-OB: CPT

## 2024-03-29 PROCEDURE — 76856 US EXAM PELVIC COMPLETE: CPT

## 2024-04-03 NOTE — PATIENT INSTRUCTIONS
If you have any questions regarding your visit, Please contact your care team.     [employer]: [phone]    To Schedule an Appointment 24/7  [facility]: [phone]     Gaudencio Brennan MD  Medical Director        Maya-GRAEME Wade-RN  Anni Rodriguez-Surgery Scheduler  Tamanna-Surgery Scheduler               Tuesday-[place]  7:30 a.m-4:30 p.m    Thursday-[place]  7:30 a.m-4:30 p.m    Typical Surgery Days: Tuesday or Friday [facility]  [address] [place] [address]  PH: [phone]    Imaging Scheduling all locations  PH: [phone]     [facility]  [address] [place] [address] [place] [address]  PH: [phone]    [facility]  [address] [place] [address]  PH: [phone] Fax      **Surgeries** Our Surgery Schedulers will contact you to schedule. If you do not receive a call within 3 business days, please call [phone].    Urgent Care locations:  [facility] Monday-Friday  10 am - 8 pm  Saturday and Sunday   9 am - 5 pm  Monday-Friday   10 am - 8 pm  Saturday and Sunday   9 am - 5 pm   [phone]   If you need a medication refill, please contact your pharmacy. Please allow 3 business days for your refill to be completed.  As always, Thank you for trusting us with your healthcare needs!    see additional instructions from your care team below

## 2024-04-08 ENCOUNTER — OFFICE VISIT (OUTPATIENT)
Dept: OBGYN | Facility: CLINIC | Age: 39
End: 2024-04-08
Payer: COMMERCIAL

## 2024-04-08 VITALS
DIASTOLIC BLOOD PRESSURE: 89 MMHG | SYSTOLIC BLOOD PRESSURE: 127 MMHG | BODY MASS INDEX: 34.48 KG/M2 | WEIGHT: 207.2 LBS | OXYGEN SATURATION: 98 % | HEART RATE: 101 BPM

## 2024-04-08 DIAGNOSIS — N83.201 CYST OF RIGHT OVARY: Primary | ICD-10-CM

## 2024-04-08 PROCEDURE — 99214 OFFICE O/P EST MOD 30 MIN: CPT | Performed by: OBSTETRICS & GYNECOLOGY

## 2024-04-08 NOTE — PROGRESS NOTES
Janeth is a 38 year old   here for follow up of from her recent hospitalization.  She was admitted to the [place] with presumed right tubo-ovarian abscess. She had been found to have a complex cystic mass in the right adnexa abutted up to the colon.  Diverticular etiology couldn't be ruled out.  She was treated with oral antibiotics.  She had an increase in pain and was seen in the ED. There was still a complex cystic area in the right adnexa.  Her WBC was normal.  She was sent to the [place] for admission and IV antibiotics.  She improved and was discharged on oral antibiotics.  She is feeling better, but has questions about repeat imaging. .  ROS: No urinary frequency or dysuria, bladder or kidney problems  ROS: Ten point review of systems was reviewed and negative except the above.    PMH: Her past medical, surgical, and obstetric histories were reviewed and are documented in their appropriate chart areas.    ALL/Meds: Her medication and allergy histories were reviewed and are documented in their appropriate chart areas.    SH/FMH: Reviewed and documented in the appropriate area.    PE: /89 (BP Location: Left arm, Patient Position: Sitting, Cuff Size: Adult Large)   Pulse 101   Wt 94 kg (207 lb 3.2 oz)   LMP 06/15/2023 (Approximate)   SpO2 98%   BMI 34.48 kg/m      Discussed that we still don't know if this is ovarian or colonic in origin.  I think it is reasonable to repeat the ultrasound in a week, to ensure resolution.  But I do think a referral to GASTROINTESTINAL may be in order to determine if she does or does not have diverticular disease..    A/P:   Janeth presents with (N83.201) Cyst of right ovary  (primary encounter diagnosis)  Comment: 30 minutes spent on the date of the encounter doing chart review, review of test results, patient visit, and documentation    Plan: US Pelvic Complete with Transvaginal                -    Orders Placed This Encounter   Procedures    US Pelvic  Complete with Transvaginal         Gaudencio Brennan MD FACOG

## 2024-06-23 ENCOUNTER — HEALTH MAINTENANCE LETTER (OUTPATIENT)
Age: 39
End: 2024-06-23

## 2024-10-21 ENCOUNTER — MEDICAL CORRESPONDENCE (OUTPATIENT)
Dept: HEALTH INFORMATION MANAGEMENT | Facility: CLINIC | Age: 39
End: 2024-10-21
Payer: COMMERCIAL

## 2024-10-21 ENCOUNTER — TRANSCRIBE ORDERS (OUTPATIENT)
Dept: OTHER | Age: 39
End: 2024-10-21

## 2024-10-21 DIAGNOSIS — K57.92 DIVERTICULITIS OF INTESTINE: Primary | ICD-10-CM

## 2024-11-20 ENCOUNTER — TELEPHONE (OUTPATIENT)
Dept: FAMILY MEDICINE | Facility: CLINIC | Age: 39
End: 2024-11-20
Payer: COMMERCIAL

## 2024-11-20 NOTE — TELEPHONE ENCOUNTER
Patient Quality Outreach    Patient is due for the following:   Cervical Cancer Screening - PAP Needed  Physical Preventive Adult Physical    Action(s) Taken:   Schedule a Adult Preventative    Type of outreach:    Sent [employer] message.    Questions for provider review:    None           Skylar Soto MA

## 2025-06-09 ENCOUNTER — MEDICAL CORRESPONDENCE (OUTPATIENT)
Dept: HEALTH INFORMATION MANAGEMENT | Facility: CLINIC | Age: 40
End: 2025-06-09
Payer: COMMERCIAL

## 2025-06-16 ENCOUNTER — ANCILLARY PROCEDURE (OUTPATIENT)
Dept: GENERAL RADIOLOGY | Facility: CLINIC | Age: 40
End: 2025-06-16
Attending: PODIATRIST
Payer: COMMERCIAL

## 2025-06-16 ENCOUNTER — OFFICE VISIT (OUTPATIENT)
Dept: PODIATRY | Facility: CLINIC | Age: 40
End: 2025-06-16
Payer: COMMERCIAL

## 2025-06-16 VITALS — WEIGHT: 185 LBS | BODY MASS INDEX: 30.82 KG/M2 | HEIGHT: 65 IN

## 2025-06-16 DIAGNOSIS — M72.2 PLANTAR FASCIITIS, LEFT: ICD-10-CM

## 2025-06-16 DIAGNOSIS — M72.2 PLANTAR FASCIITIS, LEFT: Primary | ICD-10-CM

## 2025-06-16 PROCEDURE — 99203 OFFICE O/P NEW LOW 30 MIN: CPT | Performed by: PODIATRIST

## 2025-06-16 PROCEDURE — 1125F AMNT PAIN NOTED PAIN PRSNT: CPT | Performed by: PODIATRIST

## 2025-06-16 ASSESSMENT — PAIN SCALES - GENERAL: PAINLEVEL_OUTOF10: MODERATE PAIN (4)

## 2025-06-16 NOTE — LETTER
2025      Janeth Argueta  [address] [place] [address]      Dear Colleague,    Thank you for referring your patient, Janeth Argueta, to the [facility]. Please see a copy of my visit note below.    HPI:  Janeth Argueta is a 40 year old female who is seen in consultation at the request of [facility] - Yoko Burns, APRN, CNP    Pt presents for eval of:   (Onset, Location, L/R, Character, Treatments, Injury if yes)    XR Left foot 2025     Ongoing starting summer 2024 off and on and now constant, plantar Left heel/arch pain that is more constant May 2025. No injury noted. Presents with flip flops.  Constant dull ache. Tightness with first steps after lying or sitting that improve with walking for a few minutes. Intermittent pulling pain 4-910. Average 7/10 last week.   Pain upon arising after rest.  Changing shoes every 6 months, online inserts, ice, naproxen, intermittent utilization night splint    Works at [employer] as a , on her feet 10-12 hour work shifts with steel toed shoes.  50 hours a week.  Leather work boots. She has OTC retail inserts.     ROS: 10 point ROS neg other than the symptoms noted above in the HPI.    Patient Active Problem List   Diagnosis     Calculus of kidney     CARDIOVASCULAR SCREENING; LDL GOAL LESS THAN 160     GERD (gastroesophageal reflux disease)     Anxiety     Acute gouty arthropathy     Severe obesity (BMI 35.0-39.9) with comorbidity (H)     Chronic obstructive pulmonary disease, unspecified COPD type (H)     Tubo-ovarian abscess       PAST MEDICAL HISTORY:   Past Medical History:   Diagnosis Date     Calculus of kidney ages 13 and 22     Tobacco abuse 2015     PAST SURGICAL HISTORY:   Past Surgical History:   Procedure Laterality Date      SECTION  2011    Procedure: SECTION; Surgeon:LACHO VALENTIN; Location:[facility] OR     HC TOOTH EXTRACTION W/FORCEP  age 17    wisdom tooth extraction      LITHOTRIPSY  2008    Left lithotripsy. [facility]     ORTHOPEDIC SURGERY       ZZC OPEN RX ELBOW DISLOCATION  high school    fracture right elbow, open fixation     MEDICATIONS:   Current Outpatient Medications:      albuterol (PROAIR HFA/PROVENTIL HFA/VENTOLIN HFA) 108 (90 Base) MCG/ACT inhaler, Inhale 2 puffs into the lungs every 6 hours, Disp: 18 g, Rfl: 4     NAPROXEN PO, Take by mouth., Disp: , Rfl:      omeprazole (PRILOSEC) 20 MG DR capsule, Take 20 mg by mouth daily, Disp: , Rfl:      acetaminophen (TYLENOL) 325 MG tablet, Take 2 tablets (650 mg) by mouth every 6 hours as needed for mild pain (Patient not taking: Reported on 2025), Disp: 24 tablet, Rfl: 0     ibuprofen (ADVIL/MOTRIN) 600 MG tablet, Take 1 tablet (600 mg) by mouth every 6 hours as needed for inflammatory pain (Patient not taking: Reported on 2025), Disp: 24 tablet, Rfl: 0  ALLERGIES:  No Known Allergies  SOCIAL HISTORY:   Social History     Socioeconomic History     Marital status:      Spouse name: Umair     Number of children: 2     Years of education: Not on file     Highest education level: Not on file   Occupational History     Employer: [employer]   Tobacco Use     Smoking status: Former     Current packs/day: 0.00     Average packs/day: 0.5 packs/day for 5.0 years (2.5 ttl pk-yrs)     Types: Cigarettes     Start date: 2015     Quit date: 2020     Years since quittin.4     Smokeless tobacco: Never   Vaping Use     Vaping status: Never Used   Substance and Sexual Activity     Alcohol use: No     Drug use: No     Comment: THC in the past     Sexual activity: Yes     Partners: Male     Birth control/protection: I.U.D.   Other Topics Concern      Service No     Blood Transfusions No     Caffeine Concern No     Occupational Exposure Yes     Comment: [employer]     Hobby Hazards No     Sleep Concern No     Stress Concern No     Weight Concern No     Special  "Diet No     Back Care No     Exercise No     Bike Helmet Not Asked     Seat Belt Yes     Self-Exams Yes     Parent/sibling w/ CABG, MI or angioplasty before 65F 55M? Not Asked   Social History Narrative     to Umair and lives in [place] with their daughter, Hillary. No smokers in the home.  Has one indoor cat.  Advised about toxoplasmosis precautions.  No concerns about domestic violence.     Social Drivers of Health     Financial Resource Strain: Not on file   Food Insecurity: Not on file   Transportation Needs: Not on file   Physical Activity: Not on file   Stress: Not on file   Social Connections: Not on file   Interpersonal Safety: Not on file   Housing Stability: Not on file     FAMILY HISTORY:   Family History   Problem Relation Age of Onset     Hypertension Mother      Musculoskeletal Disorder Mother         Congenital hip disorder     Genitourinary Problems Father         Kidney stones on dad's side     Respiratory Father         COPD - he was a smoker     Other Cancer Father         Passed away from Squamous cell cancer     Diabetes Paternal Grandmother      Cerebrovascular Disease Paternal Grandmother      Cardiovascular Paternal Grandmother      Congenital Anomalies Other         cousin's baby has truncus arteriosus       EXAM:Vitals: Ht 1.638 m (5' 4.5\")   Wt 83.9 kg (185 lb)   BMI 31.26 kg/m    BMI= Body mass index is 31.26 kg/m .    General appearance: Patient is alert and fully cooperative with history & exam.  No sign of distress is noted during the visit.     Psychiatric: Affect is pleasant & appropriate.  Patient appears motivated to improve health.     Respiratory: Breathing is regular & unlabored while sitting.     HEENT: Hearing is intact to spoken word.  Speech is clear.  No gross evidence of visual impairment that would impact ambulation.     Vascular: DP & PT 2/4 & regular bilaterally.  No significant edema, rubor or varicosities noted.  CFT and skin temperature is normal to both " lower extremities.       Neurologic: Lower extremity sensation is intact to light touch.  No evidence of weakness in the lower extremities.  No evidence of neuropathy and negative tinel sign.     Dermatologic: Skin is intact to both lower extremities without significant lesions, rash or abrasion.  Normal texture turgor and tone. No paronychia or evidence of soft tissue infection is noted.    Musculoskeletal: Patient is ambulatory without assistive device or brace.  Discomfort is noted with firm palpation along the medial band of the plantar fascia left foot most notably at the origination upon the calcaneus not through the arch.  No pain with compression of the calcaneus medial to lateral or with palpation of the achilles, peroneal or posterior tibial tendons.  Slightly more than 0  of ankle joint dorsiflexion without crepitus or pain throughout the ankle, subtalar or midtarsal joints.  No pain or limitations throughout manual muscle strength testing plus 5/5 to all four quadrants bilateral.  No palpable edema noted.      Radiographs:  Osteophyte noted about the plantar calcaneus consistent with plantar fasciitis.       ASSESSMENT:       ICD-10-CM    1. Plantar fasciitis, left  M72.2 XR Foot Left G/E 3 Views     Orthotics, Mastectomy and Custom Compression Orders Type: Orthotic; Orthotic Type Requested: Foot Orthotics; Foot Orthotics Laterality: Bilateral     Ankle/Foot Bracing Supplies Order Plantar Fasciitis Splint; Left        PLAN:  Reviewed patient[facility] chart in [facility] and discussed etiology and treatment options.      Treatments:  6/16/2025  Obtained and interpreted radiographs.   Discontinue barefoot walking or unsupported walking in shoes without shank.  Dispensed written instructions to obtain appropriate shoe gear and/or OTC inserts.  Dispensed anterior night splint to use all night every night.    She is using naprosyn 500 bid for a few weeks.   Prescription for custom molded orthotics 6/16/2025  Follow up in  4-5 weeks          Kenny Mancia DPM      Again, thank you for allowing me to participate in the care of your patient.        Sincerely,        Kenny Mancia DPM    Electronically signed

## 2025-06-16 NOTE — NURSING NOTE
Dispensed 1 Dorsal (Anterior) Night Splint, Size S/M, with [facility] agreement signed by patient. Nafisa Barrios CMA, June 16, 2025

## 2025-06-16 NOTE — PATIENT INSTRUCTIONS
PLANTAR FASCIITIS  The  plantar fascia  is a thick fibrous layer of tissue that covers the bones on the bottom of your foot. It supports the foot bones in an arched position.  Plantar fasciitis  is a painful inflammation of the plantar fascia due to overuse. This can develop gradually or suddenly. It usually affects one foot at a time but can affect both feet. Heel pain can be sharp and feel like a knife sticking in the bottom of your foot. Pain may occur after exercising, long distance jogging, stair climbing, long periods of standing, or after getting up from a seated position.  Risk factors include arthritis, diabetes, obesity or recent weight gain, flat-foot, high arch, wearing high heels or loose shoes or shoes with poor arch support.  Sudden changes in activity or shoe gear may contribute to symptoms.  Foot pain from this condition is usually worse in the morning and improves with walking. By the end of the day there may be a dull aching. Treatment requires improved support of feet, short-term rest and controlling inflammation. It may take up to nine months before all symptoms go away with the measures described below.  A steroid injection into the foot, or surgery may be needed if this is becomes long standing or severe.  HOME CARE  If you are overweight, lose weight to promote healing.  Choose supportive shoes (stiff through the shank) with good arch support and shock absorbency. Replace athletic shoes when they become worn out. Don t walk or run barefoot.  Shoe inserts are an important part of treatment. You can buy off-the-shelf shoe inserts inexpensively such as [employer].  The best ones are custom molded to your foot with a prescription.  Night splints keep the plantar fascia gently stretched while you sleep and will eliminate morning pain. Wear it ALL NIGHT EVERY NIGHT, or any time you sit for a long time.  Reduce by 10% or more the activities that stress the feet: jogging, prolonged standing or  walking, high impact sports, etc.  Stretch your feet. Gently flex your ankle by leaning into a wall or counter or drop your heel from a step.  Stretch two minutes of every hour you are awake.  Icing or massage may help heel pain. Apply an ice pack or frozen water or Coke bottle to the heel for 10-20 minutes as a preventive or after an acute flare of symptoms. You may repeat this as needed.   Follow up with your Doctor in 3 weeks as instructed.      Reliable shoe stores: To maximize your experience and provide the best possible fit.  Be sure to show them your foot concerns and tell them Dr. Mancia sent you.      Stores listed in bold have only athletic shoes, and stores that are not bold are mostly casual or variety of shoes    [employer]  [address] [place] [address]  [phone]     [employer] [facility]  [address] [place] [address]  [phone]     [employer]  [address] [place] [address]  [phone]    [employer]  [address] [facility] [address]  [phone]    [employer]  [address] [place], [place] [address] [phone]    [employer]  [address] [place] [address]  [phone]                         [employer] Locations:     [address] [place] [address]   [phone]     [address] [place] [address] [address] [place] [address]   [phone]     [address] [place] [address]   [phone]     [address] [facility] [place] [address]   [phone]     [address] [place] [address]   [phone]     [address] [place] [address]   [phone]     [address]  [facility] [address]   [place] [address]   [phone]     [address]   [place], [place] [address] [phone]             How to find reasonable shoes          The correct width    Correct Fitting    Correct Length      Foot Distortion    Posture Distortion                           Torsional Rigidity      Grasp behind the heel and underneath the foot and twist      Bad    Excessive torsion/twist in midfoot     Less torsion/twist in midfoot is better                   Heel Counter Rigidity      Grasp just above   midsole and squeeze      Bad    Soft heel counter      Good    Rigid Heel Counter      Flexion Rigidity      Grasp shoe and bend from forefoot to rearfoot

## 2025-06-16 NOTE — PROGRESS NOTES
HPI:  Janeth Argueta is a 40 year old female who is seen in consultation at the request of [facility] - Yoko Burns, ROXANA, CNP    Pt presents for eval of:   (Onset, Location, L/R, Character, Treatments, Injury if yes)    XR Left foot 2025     Ongoing starting summer 2024 off and on and now constant, plantar Left heel/arch pain that is more constant May 2025. No injury noted. Presents with flip flops.  Constant dull ache. Tightness with first steps after lying or sitting that improve with walking for a few minutes. Intermittent pulling pain 4-910. Average 7/10 last week.   Pain upon arising after rest.  Changing shoes every 6 months, online inserts, ice, naproxen, intermittent utilization night splint    Works at [employer] as a , on her feet 10-12 hour work shifts with steel toed shoes.  50 hours a week.  Leather work boots. She has OTC retail inserts.     ROS: 10 point ROS neg other than the symptoms noted above in the HPI.    Patient Active Problem List   Diagnosis    Calculus of kidney    CARDIOVASCULAR SCREENING; LDL GOAL LESS THAN 160    GERD (gastroesophageal reflux disease)    Anxiety    Acute gouty arthropathy    Severe obesity (BMI 35.0-39.9) with comorbidity (H)    Chronic obstructive pulmonary disease, unspecified COPD type (H)    Tubo-ovarian abscess       PAST MEDICAL HISTORY:   Past Medical History:   Diagnosis Date    Calculus of kidney ages 13 and 22    Tobacco abuse 2015     PAST SURGICAL HISTORY:   Past Surgical History:   Procedure Laterality Date     SECTION  2011    Procedure: SECTION; Surgeon:LACHO VALENTIN; Location: OR     TOOTH EXTRACTION W/FORCEP  age 17    wisdom tooth extraction    LITHOTRIPSY  2008    Left lithotripsy. [facility]    ORTHOPEDIC SURGERY      ZC OPEN RX ELBOW DISLOCATION  high school    fracture right elbow, open fixation     MEDICATIONS:   Current Outpatient Medications:     albuterol (PROAIR  HFA/PROVENTIL HFA/VENTOLIN HFA) 108 (90 Base) MCG/ACT inhaler, Inhale 2 puffs into the lungs every 6 hours, Disp: 18 g, Rfl: 4    NAPROXEN PO, Take by mouth., Disp: , Rfl:     omeprazole (PRILOSEC) 20 MG DR capsule, Take 20 mg by mouth daily, Disp: , Rfl:     acetaminophen (TYLENOL) 325 MG tablet, Take 2 tablets (650 mg) by mouth every 6 hours as needed for mild pain (Patient not taking: Reported on 2025), Disp: 24 tablet, Rfl: 0    ibuprofen (ADVIL/MOTRIN) 600 MG tablet, Take 1 tablet (600 mg) by mouth every 6 hours as needed for inflammatory pain (Patient not taking: Reported on 2025), Disp: 24 tablet, Rfl: 0  ALLERGIES:  No Known Allergies  SOCIAL HISTORY:   Social History     Socioeconomic History    Marital status:      Spouse name: Umair    Number of children: 2    Years of education: Not on file    Highest education level: Not on file   Occupational History     Employer: [employer]   Tobacco Use    Smoking status: Former     Current packs/day: 0.00     Average packs/day: 0.5 packs/day for 5.0 years (2.5 ttl pk-yrs)     Types: Cigarettes     Start date: 2015     Quit date: 2020     Years since quittin.4    Smokeless tobacco: Never   Vaping Use    Vaping status: Never Used   Substance and Sexual Activity    Alcohol use: No    Drug use: No     Comment: THC in the past    Sexual activity: Yes     Partners: Male     Birth control/protection: I.U.D.   Other Topics Concern     Service No    Blood Transfusions No    Caffeine Concern No    Occupational Exposure Yes     Comment: [employer] No    Sleep Concern No    Stress Concern No    Weight Concern No    Special Diet No    Back Care No    Exercise No    Bike Helmet Not Asked    Seat Belt Yes    Self-Exams Yes    Parent/sibling w/ CABG, MI or angioplasty before 65F 55M? Not Asked   Social History Narrative     to Umair and lives in [place] with their daughter, Hillary. No smokers  "in the home.  Has one indoor cat.  Advised about toxoplasmosis precautions.  No concerns about domestic violence.     Social Drivers of Health     Financial Resource Strain: Not on file   Food Insecurity: Not on file   Transportation Needs: Not on file   Physical Activity: Not on file   Stress: Not on file   Social Connections: Not on file   Interpersonal Safety: Not on file   Housing Stability: Not on file     FAMILY HISTORY:   Family History   Problem Relation Age of Onset    Hypertension Mother     Musculoskeletal Disorder Mother         Congenital hip disorder    Genitourinary Problems Father         Kidney stones on dad's side    Respiratory Father         COPD - he was a smoker    Other Cancer Father         Passed away from Squamous cell cancer    Diabetes Paternal Grandmother     Cerebrovascular Disease Paternal Grandmother     Cardiovascular Paternal Grandmother     Congenital Anomalies Other         cousin's baby has truncus arteriosus       EXAM:Vitals: Ht 1.638 m (5' 4.5\")   Wt 83.9 kg (185 lb)   BMI 31.26 kg/m    BMI= Body mass index is 31.26 kg/m .    General appearance: Patient is alert and fully cooperative with history & exam.  No sign of distress is noted during the visit.     Psychiatric: Affect is pleasant & appropriate.  Patient appears motivated to improve health.     Respiratory: Breathing is regular & unlabored while sitting.     HEENT: Hearing is intact to spoken word.  Speech is clear.  No gross evidence of visual impairment that would impact ambulation.     Vascular: DP & PT 2/4 & regular bilaterally.  No significant edema, rubor or varicosities noted.  CFT and skin temperature is normal to both lower extremities.       Neurologic: Lower extremity sensation is intact to light touch.  No evidence of weakness in the lower extremities.  No evidence of neuropathy and negative tinel sign.     Dermatologic: Skin is intact to both lower extremities without significant lesions, rash or abrasion. "  Normal texture turgor and tone. No paronychia or evidence of soft tissue infection is noted.    Musculoskeletal: Patient is ambulatory without assistive device or brace.  Discomfort is noted with firm palpation along the medial band of the plantar fascia left foot most notably at the origination upon the calcaneus not through the arch.  No pain with compression of the calcaneus medial to lateral or with palpation of the achilles, peroneal or posterior tibial tendons.  Slightly more than 0  of ankle joint dorsiflexion without crepitus or pain throughout the ankle, subtalar or midtarsal joints.  No pain or limitations throughout manual muscle strength testing plus 5/5 to all four quadrants bilateral.  No palpable edema noted.      Radiographs:  Osteophyte noted about the plantar calcaneus consistent with plantar fasciitis.       ASSESSMENT:       ICD-10-CM    1. Plantar fasciitis, left  M72.2 XR Foot Left G/E 3 Views     Orthotics, Mastectomy and Custom Compression Orders Type: Orthotic; Orthotic Type Requested: Foot Orthotics; Foot Orthotics Laterality: Bilateral     Ankle/Foot Bracing Supplies Order Plantar Fasciitis Splint; Left        PLAN:  Reviewed patient's chart in [facility] and discussed etiology and treatment options.      Treatments:  6/16/2025  Obtained and interpreted radiographs.   Discontinue barefoot walking or unsupported walking in shoes without shank.  Dispensed written instructions to obtain appropriate shoe gear and/or OTC inserts.  Dispensed anterior night splint to use all night every night.    She is using naprosyn 500 bid for a few weeks.   Prescription for custom molded orthotics 6/16/2025  Follow up in 4-5 weeks          Kenny Mancia DPM

## 2025-07-12 ENCOUNTER — HEALTH MAINTENANCE LETTER (OUTPATIENT)
Age: 40
End: 2025-07-12